# Patient Record
Sex: FEMALE | Race: WHITE | NOT HISPANIC OR LATINO | Employment: UNEMPLOYED | URBAN - NONMETROPOLITAN AREA
[De-identification: names, ages, dates, MRNs, and addresses within clinical notes are randomized per-mention and may not be internally consistent; named-entity substitution may affect disease eponyms.]

---

## 2020-11-13 ENCOUNTER — TELEPHONE (OUTPATIENT)
Dept: OBSTETRICS AND GYNECOLOGY | Facility: CLINIC | Age: 20
End: 2020-11-13

## 2020-11-13 NOTE — TELEPHONE ENCOUNTER
I called to remind patient of her appointment on Monday November 16. She did not answer, and her voicemail has not been set up.

## 2020-11-16 ENCOUNTER — INITIAL PRENATAL (OUTPATIENT)
Dept: OBSTETRICS AND GYNECOLOGY | Facility: CLINIC | Age: 20
End: 2020-11-16

## 2020-11-16 ENCOUNTER — LAB (OUTPATIENT)
Dept: LAB | Facility: HOSPITAL | Age: 20
End: 2020-11-16

## 2020-11-16 VITALS
DIASTOLIC BLOOD PRESSURE: 60 MMHG | BODY MASS INDEX: 21.91 KG/M2 | HEIGHT: 60 IN | WEIGHT: 111.6 LBS | SYSTOLIC BLOOD PRESSURE: 102 MMHG

## 2020-11-16 DIAGNOSIS — Z32.01 POSITIVE PREGNANCY TEST: Primary | ICD-10-CM

## 2020-11-16 DIAGNOSIS — Z36.87 ENCOUNTER FOR ANTENATAL SCREENING FOR UNCERTAIN DATES: ICD-10-CM

## 2020-11-16 DIAGNOSIS — R11.0 NAUSEA: ICD-10-CM

## 2020-11-16 DIAGNOSIS — Z32.00 PREGNANCY EXAMINATION OR TEST, PREGNANCY UNCONFIRMED: ICD-10-CM

## 2020-11-16 DIAGNOSIS — Z34.00 SUPERVISION OF NORMAL FIRST PREGNANCY, ANTEPARTUM: ICD-10-CM

## 2020-11-16 DIAGNOSIS — O99.331 MATERNAL TOBACCO USE IN FIRST TRIMESTER: ICD-10-CM

## 2020-11-16 DIAGNOSIS — Z34.00 SUPERVISION OF NORMAL FIRST PREGNANCY, ANTEPARTUM: Primary | ICD-10-CM

## 2020-11-16 LAB
ABO GROUP BLD: NORMAL
AMPHET+METHAMPHET UR QL: NEGATIVE
AMPHETAMINES UR QL: NEGATIVE
B-HCG UR QL: POSITIVE
BARBITURATES UR QL SCN: NEGATIVE
BASOPHILS # BLD AUTO: 0.05 10*3/MM3 (ref 0–0.2)
BASOPHILS NFR BLD AUTO: 0.5 % (ref 0–1.5)
BENZODIAZ UR QL SCN: NEGATIVE
BILIRUB UR QL STRIP: NEGATIVE
BLD GP AB SCN SERPL QL: NEGATIVE
BUPRENORPHINE SERPL-MCNC: NEGATIVE NG/ML
CANNABINOIDS SERPL QL: NEGATIVE
CLARITY UR: ABNORMAL
COCAINE UR QL: NEGATIVE
COLOR UR: ABNORMAL
DEPRECATED RDW RBC AUTO: 38.9 FL (ref 37–54)
EOSINOPHIL # BLD AUTO: 0.11 10*3/MM3 (ref 0–0.4)
EOSINOPHIL NFR BLD AUTO: 1 % (ref 0.3–6.2)
ERYTHROCYTE [DISTWIDTH] IN BLOOD BY AUTOMATED COUNT: 12.2 % (ref 12.3–15.4)
GLUCOSE UR STRIP-MCNC: NEGATIVE MG/DL
HCT VFR BLD AUTO: 35.4 % (ref 34–46.6)
HGB BLD-MCNC: 12.6 G/DL (ref 12–15.9)
HGB UR QL STRIP.AUTO: NEGATIVE
IMM GRANULOCYTES # BLD AUTO: 0.04 10*3/MM3 (ref 0–0.05)
IMM GRANULOCYTES NFR BLD AUTO: 0.4 % (ref 0–0.5)
INTERNAL NEGATIVE CONTROL: NEGATIVE
INTERNAL POSITIVE CONTROL: POSITIVE
KETONES UR QL STRIP: ABNORMAL
LEUKOCYTE ESTERASE UR QL STRIP.AUTO: ABNORMAL
LYMPHOCYTES # BLD AUTO: 1.43 10*3/MM3 (ref 0.7–3.1)
LYMPHOCYTES NFR BLD AUTO: 13.4 % (ref 19.6–45.3)
Lab: ABNORMAL
Lab: NORMAL
MCH RBC QN AUTO: 31.5 PG (ref 26.6–33)
MCHC RBC AUTO-ENTMCNC: 35.6 G/DL (ref 31.5–35.7)
MCV RBC AUTO: 88.5 FL (ref 79–97)
METHADONE UR QL SCN: NEGATIVE
MONOCYTES # BLD AUTO: 0.98 10*3/MM3 (ref 0.1–0.9)
MONOCYTES NFR BLD AUTO: 9.2 % (ref 5–12)
NEUTROPHILS NFR BLD AUTO: 75.5 % (ref 42.7–76)
NEUTROPHILS NFR BLD AUTO: 8.08 10*3/MM3 (ref 1.7–7)
NITRITE UR QL STRIP: NEGATIVE
NRBC BLD AUTO-RTO: 0 /100 WBC (ref 0–0.2)
OPIATES UR QL: NEGATIVE
OXYCODONE UR QL SCN: NEGATIVE
PCP UR QL SCN: NEGATIVE
PH UR STRIP.AUTO: 6 [PH] (ref 5–9)
PLATELET # BLD AUTO: 225 10*3/MM3 (ref 140–450)
PMV BLD AUTO: 11.5 FL (ref 6–12)
PROPOXYPH UR QL: NEGATIVE
PROT UR QL STRIP: ABNORMAL
RBC # BLD AUTO: 4 10*6/MM3 (ref 3.77–5.28)
RH BLD: POSITIVE
SP GR UR STRIP: 1.04 (ref 1–1.03)
TRICYCLICS UR QL SCN: NEGATIVE
UROBILINOGEN UR QL STRIP: ABNORMAL
WBC # BLD AUTO: 10.69 10*3/MM3 (ref 3.4–10.8)

## 2020-11-16 PROCEDURE — 87591 N.GONORRHOEAE DNA AMP PROB: CPT | Performed by: NURSE PRACTITIONER

## 2020-11-16 PROCEDURE — 99203 OFFICE O/P NEW LOW 30 MIN: CPT | Performed by: NURSE PRACTITIONER

## 2020-11-16 PROCEDURE — 80306 DRUG TEST PRSMV INSTRMNT: CPT | Performed by: NURSE PRACTITIONER

## 2020-11-16 PROCEDURE — 36415 COLL VENOUS BLD VENIPUNCTURE: CPT

## 2020-11-16 PROCEDURE — 87491 CHLMYD TRACH DNA AMP PROBE: CPT | Performed by: NURSE PRACTITIONER

## 2020-11-16 PROCEDURE — 81003 URINALYSIS AUTO W/O SCOPE: CPT | Performed by: NURSE PRACTITIONER

## 2020-11-16 PROCEDURE — 86787 VARICELLA-ZOSTER ANTIBODY: CPT

## 2020-11-16 PROCEDURE — 86850 RBC ANTIBODY SCREEN: CPT | Performed by: NURSE PRACTITIONER

## 2020-11-16 PROCEDURE — 81025 URINE PREGNANCY TEST: CPT | Performed by: NURSE PRACTITIONER

## 2020-11-16 PROCEDURE — 80081 OBSTETRIC PANEL INC HIV TSTG: CPT | Performed by: NURSE PRACTITIONER

## 2020-11-16 PROCEDURE — 87086 URINE CULTURE/COLONY COUNT: CPT | Performed by: NURSE PRACTITIONER

## 2020-11-16 PROCEDURE — 86900 BLOOD TYPING SEROLOGIC ABO: CPT | Performed by: NURSE PRACTITIONER

## 2020-11-16 PROCEDURE — 86901 BLOOD TYPING SEROLOGIC RH(D): CPT | Performed by: NURSE PRACTITIONER

## 2020-11-16 PROCEDURE — 86803 HEPATITIS C AB TEST: CPT | Performed by: NURSE PRACTITIONER

## 2020-11-16 PROCEDURE — 87661 TRICHOMONAS VAGINALIS AMPLIF: CPT | Performed by: NURSE PRACTITIONER

## 2020-11-16 RX ORDER — ONDANSETRON 4 MG/1
4 TABLET, ORALLY DISINTEGRATING ORAL
Status: ON HOLD | COMMUNITY
Start: 2020-06-09 | End: 2021-04-29

## 2020-11-16 RX ORDER — PRENATAL VIT NO.126/IRON/FOLIC 28MG-0.8MG
1 TABLET ORAL DAILY
COMMUNITY
End: 2022-01-11

## 2020-11-16 NOTE — PROGRESS NOTES
I spent approximately 45 minutes with the patient acquiring the health and history intake and discussing topics related to healthy lifestyle. She is accompanied by her boyfriend Joshua.  Her LMP is approximately 9/26/20.  This is her 1st pregnancy. A newob bag is given. The 1st trimester teaching was done with the patient. We discussed a healthy diet and exercise and what is recommended. I also discussed Listeriosis and Toxoplasmosis. She says she does not eat fish, so mercury was not discussed. I informed patient not to be in hot tubs, saunas, or tanning beds. We discussed that spotting may occur after intercourse which is common, but if heavy bleeding like a period occurs to [facility] or hospital if clinic is closed.  I encouraged her to make an appointment with the dentist if she has not had a dental exam and cleaning in the last 6 months.  I instructed the patient that alcohol, illicit drug use, and tobacco smoking are not recommended in pregnancy. I told her to avoid secondhand smoke. She says she does smoke about 2 packs of cigarettes per week. She is trying to quit. She plans to breastfeed. I gave her pamphlet on breastfeeding classes and the breastfeeding mothers support group. These services are provided by Veda Perkins, Lactation Consultant. She filled out the health department referral form and depression screening questionnaire. I gave her a WIC and HANDS pamphlet. She says she did sign up for WIC. I told the patient that group prenatal education classes are offered here. I instructed patient to let the provider know if she would like to join a group after EDC is established. A Centering Pregnancy pamphlet is given. I encouraged the patient to get the TDAP vaccine in the 3rd trimester.  I discussed with the patient that a pediatrician needs to be chosen prior to delivery for the infant to have an appointment scheduled before leaving the hospital.  I discussed lab tests will be done today. All  questions were answered at this time. She is seeing Guerline HOWELL today.

## 2020-11-17 PROBLEM — R11.0 NAUSEA: Status: ACTIVE | Noted: 2020-11-17

## 2020-11-17 PROBLEM — O99.331 MATERNAL TOBACCO USE IN FIRST TRIMESTER: Status: ACTIVE | Noted: 2020-11-17

## 2020-11-17 LAB
BACTERIA SPEC AEROBE CULT: NO GROWTH
C TRACH RRNA SPEC QL NAA+PROBE: NEGATIVE
HBV SURFACE AG SERPL QL IA: NORMAL
HCV AB SER DONR QL: NORMAL
HIV1+2 AB SER QL: NORMAL
N GONORRHOEA RRNA SPEC QL NAA+PROBE: NEGATIVE
RPR SER QL: NORMAL
RUBV IGG SERPL IA-ACNC: NORMAL
T VAGINALIS DNA SPEC QL NAA+PROBE: NEGATIVE
VZV IGG SER IA-ACNC: NEGATIVE

## 2020-11-17 NOTE — PROGRESS NOTES
[facility]  Obstetrics Visit    CHIEF COMPLAINT:  New prenatal visit    HISTORY OF PRESENT ILLNESS:  Becky Shaffer is a 19 y.o. y/o  at Unknown by LMP (Patient's last menstrual period was 2020 (approximate).  This was not a planned pregnancy and the patient is supported by significant other.  Reports occasional nausea.  She denies any vaginal bleeding.  She has started taking a prenatal vitamin.    REVIEW OF SYSTEMS  Review of Systems   Constitutional: Negative for activity change, appetite change, diaphoresis, fatigue, unexpected weight gain and unexpected weight loss.   Respiratory: Negative for apnea, chest tightness and shortness of breath.    Cardiovascular: Negative for chest pain and palpitations.   Gastrointestinal: Positive for nausea. Negative for abdominal distention, abdominal pain, constipation, diarrhea, rectal pain, vomiting, GERD and indigestion.   Genitourinary: Negative for amenorrhea, breast discharge, breast lump, breast pain, decreased libido, decreased urine volume, difficulty urinating, dyspareunia, dysuria, flank pain, frequency, genital sores, hematuria, pelvic pain, pelvic pressure, urgency, urinary incontinence, vaginal bleeding, vaginal discharge and vaginal pain.   Musculoskeletal: Negative for myalgias.   Skin: Negative for color change, dry skin and skin lesions.   Neurological: Negative for light-headedness and headache.   Psychiatric/Behavioral: Negative for agitation, dysphoric mood, sleep disturbance, suicidal ideas, depressed mood and stress. The patient is not nervous/anxious.        PRENATAL RISK FACTORS   Problems (from 20 to present)     Problem Noted Resolved    Maternal tobacco use in first trimester 2020 by Guerline Stacy, LYNDA No    Nausea 2020 by Guerline Stacy, LYNDA No          DATING CRITERIA:  Approx. LMP (20) -- LILIANA 2021      OBSTETRIC HISTORY:  OB History    Para  Term  AB Living   1             SAB TAB Ectopic Molar Multiple Live Births                    # Outcome Date GA Lbr Gera/2nd Weight Sex Delivery Anes PTL Lv   1 Current              GYN HISTORY:    Last pap smear:   Last Completed Pap Smear     Patient has no health maintenance due at this time            PAST MEDICAL HISTORY:  Past Medical History:   Diagnosis Date   • Anxiety    • Migraine    • Smoker      PAST SURGICAL HISTORY:  Past Surgical History:   Procedure Laterality Date   • WISDOM TOOTH EXTRACTION       FAMILY HISTORY:  Family History   Problem Relation Age of Onset   • No Known Problems Sister    • No Known Problems Brother    • No Known Problems Sister    • No Known Problems Brother    • No Known Problems Brother      SOCIAL HISTORY:  Social History     Socioeconomic History   • Marital status: Single     Spouse name: Not on file   • Number of children: Not on file   • Years of education: Not on file   • Highest education level: Not on file   Tobacco Use   • Smoking status: Current Every Day Smoker   • Smokeless tobacco: Never Used   • Tobacco comment: 2packs per week   Substance and Sexual Activity   • Alcohol use: Not Currently   • Drug use: Not Currently   • Sexual activity: Yes     Partners: Male     GENETIC SCREENING:  Age >36 yo as of LILIANA: no  Thalassemia: no  NTD: no  CHD: no  Down Syndrome/MR/Fragile X/Autism: no  Ashkenazi Nondenominational with Maximino-Sachs, [place], familial dysautonomia: non  Sickle cell disease or trait: no  Hemophilia: no  Muscular dystrophy: no  Cystic fibrosis: no  Glades's chorea: no  Birth defects: no  Genetic/chromosomal disorders: no    INFECTION HISTORY:  TB exposure: no  HSV: no  Illness since LMP: no  Prior GBS infected child: no  STIs: no    ALLERGIES:  No Known Allergies    MEDICATIONS:  Prior to Admission medications    Medication Sig Start Date End Date Taking? Authorizing Provider   ondansetron ODT (ZOFRAN-ODT) 4 MG disintegrating tablet Take 4 mg by mouth.  20  Yes Provider, MD Amanuel   prenatal vitamin (prenatal, CLASSIC, vitamin) tablet Take 1 tablet by mouth Daily.   Yes Provider, MD Amanuel       PHYSICAL EXAM:   LMP 2020 (Approximate)   General: Alert, healthy, no distress, well nourished and well developed.  Neurologic: Alert, oriented to person, place, and time.  Gait normal.  Cranial nerves II-XII grossly intact.  HEENT: Normocephalic, atraumatic.  Extraocular muscles intact, pupils equal and reactive x2.    Teeth: Normal hygiene.  Neck: Supple, no adenopathy, thyroid normal size, non-tender, without nodularity, trachea midline.  Breasts: No masses, skin dimpling, skin retraction, nipple discharge, or asymmetry bilaterally.  Lungs: Normal respiratory effort.  Clear to auscultation bilaterally.  No wheezes, rhonci, or rales.  Heart: Regular rate and rhythm.  No murmer, rub or gallop.  Abdomen: Soft, non-tender, non-distended,no masses, no hepatosplenomegaly, no hernia.  Skin: No rash, no lesions.  Extremities: No cyanosis, clubbing or edema.    Bedside ultrasound performed by myself which shows the findings below: +HHUS      IMPRESSION:  Becky Shaffer is a 19 y.o.  at Unknown for a new prenatal visit.    PLAN:  1. NOB  - Prenatal labs ordered  - Genetic testing, including cystic fibrosis, was discussed and patient declines   - Continue prenatal vitamins  - Weight gain counseling performed.   - [place]id BMI 18.5-24.9: Recommend 25-35 lb  - Return to clinic in 4 weeks for return prenatal visit and dating scan   - Reviewed COVID-19 visitation policy  - Reviewed COVID-19 precautions     Diagnosis Plan   1. Positive pregnancy test     2. Maternal tobacco use in first trimester  Educated on risks of smoking in pregnancy, strongly encouraged complete smoking cessation    3. Nausea  Encouraged small frequent protein snacks   4. Encounter for  screening for uncertain dates  US Ob Transvaginal     Guerline Stacy  APRN  11/17/2020  11:16 CST

## 2020-12-16 ENCOUNTER — ROUTINE PRENATAL (OUTPATIENT)
Dept: OBSTETRICS AND GYNECOLOGY | Facility: CLINIC | Age: 20
End: 2020-12-16

## 2020-12-16 ENCOUNTER — TELEPHONE (OUTPATIENT)
Dept: OBSTETRICS AND GYNECOLOGY | Facility: CLINIC | Age: 20
End: 2020-12-16

## 2020-12-16 VITALS — DIASTOLIC BLOOD PRESSURE: 68 MMHG | SYSTOLIC BLOOD PRESSURE: 92 MMHG | WEIGHT: 109 LBS | BODY MASS INDEX: 21.29 KG/M2

## 2020-12-16 DIAGNOSIS — Z34.01 ENCOUNTER FOR SUPERVISION OF NORMAL FIRST PREGNANCY IN FIRST TRIMESTER: ICD-10-CM

## 2020-12-16 DIAGNOSIS — O99.331 MATERNAL TOBACCO USE IN FIRST TRIMESTER: ICD-10-CM

## 2020-12-16 DIAGNOSIS — Z3A.12 12 WEEKS GESTATION OF PREGNANCY: Primary | ICD-10-CM

## 2020-12-16 DIAGNOSIS — O21.9 NAUSEA AND VOMITING DURING PREGNANCY PRIOR TO 22 WEEKS GESTATION: ICD-10-CM

## 2020-12-16 PROBLEM — Z34.00 SUPERVISION OF NORMAL FIRST PREGNANCY: Status: ACTIVE | Noted: 2020-12-16

## 2020-12-16 PROCEDURE — 99213 OFFICE O/P EST LOW 20 MIN: CPT | Performed by: FAMILY MEDICINE

## 2020-12-16 RX ORDER — PYRIDOXINE HCL (VITAMIN B6) 25 MG
25 TABLET ORAL EVERY 8 HOURS
Qty: 90 TABLET | Refills: 1 | Status: ON HOLD | OUTPATIENT
Start: 2020-12-16 | End: 2021-04-29

## 2020-12-16 NOTE — TELEPHONE ENCOUNTER
Correct spelling of pt's name is RACHEL Bah medicaid has patients named spelled without the E on the end. CIRO. Called pt to inform her to please have this corrected. She said she would take care of it.

## 2020-12-16 NOTE — PROGRESS NOTES
CC: Prenatal visit    Becky Shaffer is a 20 y.o.  at 12w5d.  Doing well.  No complaints.  Denies contractions, LOF, or VB.  Has not yet felt FM.    Dating scan: IUP, FHR 168bpm, EGA by CRL 12w5d with LILIANA 21 +1w1d from LMP. Will use this u/s to est LILIANA.    BP 92/68   Wt 49.4 kg (109 lb)   LMP 2020 (Approximate)   BMI 21.29 kg/m²   SVE: deferred     Fetal Heart Rate: 168 US     Problems (from 20 to present)     Problem Noted Resolved    Maternal tobacco use in first trimester 2020 by Guerline Stacy APRN No    Nausea 2020 by Guerilne Stacy APRN No          A/P: Becky Shaffer is a 20 y.o.  at 12w5d.  - RTC in 4 weeks  - She is unsure about OB Centering  - Cramping/bleeding precautions given     Diagnosis Plan   1. 12 weeks gestation of pregnancy     2. Nausea and vomiting during pregnancy prior to 22 weeks gestation  pyridoxine (VITAMIN B-6) 25 MG tablet    doxylamine (UNISOM) 25 MG tablet   3. Maternal tobacco use in first trimester     4. Encounter for supervision of normal first pregnancy in first trimester         Signature  Crystal Wheatley MD  [facility]  [address] [place] [address]  Office: [phone]      This document has been electronically signed by Crystal Wheatley MD on 2020 13:25 CST

## 2021-01-11 ENCOUNTER — ROUTINE PRENATAL (OUTPATIENT)
Dept: OBSTETRICS AND GYNECOLOGY | Facility: CLINIC | Age: 21
End: 2021-01-11

## 2021-01-11 VITALS — SYSTOLIC BLOOD PRESSURE: 98 MMHG | BODY MASS INDEX: 21.29 KG/M2 | DIASTOLIC BLOOD PRESSURE: 62 MMHG | WEIGHT: 109 LBS

## 2021-01-11 DIAGNOSIS — O99.331 MATERNAL TOBACCO USE IN FIRST TRIMESTER: ICD-10-CM

## 2021-01-11 DIAGNOSIS — Z34.01 ENCOUNTER FOR SUPERVISION OF NORMAL FIRST PREGNANCY IN FIRST TRIMESTER: ICD-10-CM

## 2021-01-11 DIAGNOSIS — O21.9 NAUSEA AND VOMITING DURING PREGNANCY PRIOR TO 22 WEEKS GESTATION: ICD-10-CM

## 2021-01-11 DIAGNOSIS — Z3A.16 16 WEEKS GESTATION OF PREGNANCY: Primary | ICD-10-CM

## 2021-01-11 DIAGNOSIS — Z36.3 ENCOUNTER FOR ANTENATAL SCREENING FOR MALFORMATIONS: ICD-10-CM

## 2021-01-11 PROCEDURE — 99213 OFFICE O/P EST LOW 20 MIN: CPT | Performed by: FAMILY MEDICINE

## 2021-01-11 NOTE — PROGRESS NOTES
CC: Prenatal visit    Becky Shaffer is a 20 y.o.  at 16w3d.  Doing well.  No complaints.  Denies contractions, LOF, or VB.  Has started to feel FM. Interested in OB Centering.    BP 98/62   Wt 49.4 kg (109 lb)   LMP 2020 (Approximate)   BMI 21.29 kg/m²   SVE: deferred     Fetal Heart Rate: 160s HHUS     Problems (from 20 to present)     Problem Noted Resolved    Supervision of normal first pregnancy 2020 by Crystal Wheatley MD No    Overview Signed 2020  1:29 PM by Crystal Wheatley MD     A POS/ Rubella equivocal/ GBS @ 36wks  DatinTUS 20 12w5d  Genetics: declined  Tdap: 28wks  Flu: will offer  Anatomy: 20wks  1h Glucola: 28wks  Lab Results   Component Value Date    HGB 12.6 2020    HCT 35.4 2020     2020     Feeding Method: undecided  BC plan: ?           Maternal tobacco use in first trimester 2020 by Guerline Stacy APRN No    Nausea and vomiting during pregnancy prior to 22 weeks gestation 2020 by Guerline Stacy APRN No          A/P: Becky Shaffer is a 20 y.o.  at 16w3d.  - RTC in 4 weeks for OB Centering & Anatomy scan  - cramping precautions given     Diagnosis Plan   1. 16 weeks gestation of pregnancy     2. Encounter for supervision of normal first pregnancy in first trimester     3. Nausea and vomiting during pregnancy prior to 22 weeks gestation     4. Maternal tobacco use in first trimester     5. Encounter for  screening for malformations  US Ob 14 + Weeks Single or First Gestation       Signature  Crystal Wheatley MD  [facility]  [address] [place] [address], [place] [address]  Office: [phone]      This document has been electronically signed by Crystal Wheatley MD on 2021 13:24 CST

## 2021-02-10 ENCOUNTER — ROUTINE PRENATAL (OUTPATIENT)
Dept: OBSTETRICS AND GYNECOLOGY | Facility: CLINIC | Age: 21
End: 2021-02-10

## 2021-02-10 VITALS — DIASTOLIC BLOOD PRESSURE: 60 MMHG | SYSTOLIC BLOOD PRESSURE: 98 MMHG | WEIGHT: 110 LBS | BODY MASS INDEX: 21.48 KG/M2

## 2021-02-10 DIAGNOSIS — O99.331 MATERNAL TOBACCO USE IN FIRST TRIMESTER: ICD-10-CM

## 2021-02-10 DIAGNOSIS — Z34.02 ENCOUNTER FOR SUPERVISION OF NORMAL FIRST PREGNANCY IN SECOND TRIMESTER: ICD-10-CM

## 2021-02-10 DIAGNOSIS — Z3A.20 20 WEEKS GESTATION OF PREGNANCY: Primary | ICD-10-CM

## 2021-02-10 DIAGNOSIS — M54.50 LOW BACK PAIN DURING PREGNANCY, SECOND TRIMESTER: ICD-10-CM

## 2021-02-10 DIAGNOSIS — Z36.2 ANTENATAL SCREENING BASED ON AMNIOCENTESIS: ICD-10-CM

## 2021-02-10 DIAGNOSIS — O21.9 NAUSEA AND VOMITING DURING PREGNANCY PRIOR TO 22 WEEKS GESTATION: ICD-10-CM

## 2021-02-10 DIAGNOSIS — Z36.2 ENCOUNTER FOR OTHER ANTENATAL SCREENING FOLLOW-UP: ICD-10-CM

## 2021-02-10 DIAGNOSIS — O26.892 LOW BACK PAIN DURING PREGNANCY, SECOND TRIMESTER: ICD-10-CM

## 2021-02-10 PROCEDURE — 99213 OFFICE O/P EST LOW 20 MIN: CPT | Performed by: FAMILY MEDICINE

## 2021-02-10 NOTE — PROGRESS NOTES
CC: Prenatal visit    Becky Shaffer is a 20 y.o.  at 20w5d.  Denies contractions, LOF, or VB.  Reports good FM. She reports radicular back pain.   She was scheduled for OB Centering today but canceled.  She has decided she does not wish to do OB Centering.    Anatomy: breech, anterior placenta, FHR 137bpm, male, EFW 348g, sub opt feet/spine/3VTV. Findings reviewed with patient/FOB    BP 98/60   Wt 49.9 kg (110 lb)   LMP 2020 (Approximate)   BMI 21.48 kg/m²   SVE: deferred     Fetal Heart Rate: 137US     Problems (from 20 to present)     Problem Noted Resolved    Encounter for supervision of normal first pregnancy in second trimester 2020 by Crystal Wheatley MD No    Overview Signed 2020  1:29 PM by Crystal Wheatley MD     A POS/ Rubella equivocal/ GBS @ 36wks  DatinTUS 20 12w5d  Genetics: declined  Tdap: 28wks  Flu: will offer  Anatomy: 20wks  1h Glucola: 28wks  Lab Results   Component Value Date    HGB 12.6 2020    HCT 35.4 2020     2020     Feeding Method: undecided  BC plan: ?           Maternal tobacco use in first trimester 2020 by Guerline Stacy, LYNDA No    Nausea and vomiting during pregnancy prior to 22 weeks gestation 2020 by Guerline Stacy APRN No          A/P: Becky Shaffer is a 20 y.o.  at 20w5d.  - RTC in 4 weeks with repeat scan for sub opt views  - cramping precautions given  - pt has pregnancy support belt at home - recommend she try this to help with back pain; if pain persists will refer to PFPT     Diagnosis Plan   1. 20 weeks gestation of pregnancy     2. Encounter for supervision of normal first pregnancy in second trimester     3. Nausea and vomiting during pregnancy prior to 22 weeks gestation     4. Maternal tobacco use in first trimester     5.  screening based on amniocentesis     6. Encounter for other  screening follow-up  MFM  OB US MAD   7. Low back pain during pregnancy, second trimester         Signature  Crystal Wheatley MD  [address], [place] [address]  Office: [phone]      This document has been electronically signed by Crystal Wheatley MD on February 10, 2021 12:04 CST

## 2021-03-10 ENCOUNTER — ROUTINE PRENATAL (OUTPATIENT)
Dept: OBSTETRICS AND GYNECOLOGY | Facility: CLINIC | Age: 21
End: 2021-03-10

## 2021-03-10 VITALS — BODY MASS INDEX: 22.19 KG/M2 | SYSTOLIC BLOOD PRESSURE: 112 MMHG | DIASTOLIC BLOOD PRESSURE: 64 MMHG | WEIGHT: 113.6 LBS

## 2021-03-10 DIAGNOSIS — Z3A.24 24 WEEKS GESTATION OF PREGNANCY: Primary | ICD-10-CM

## 2021-03-10 DIAGNOSIS — Z82.79 FAMILY HISTORY OF CONGENITAL HEART DEFECT: ICD-10-CM

## 2021-03-10 DIAGNOSIS — O21.9 NAUSEA AND VOMITING DURING PREGNANCY PRIOR TO 22 WEEKS GESTATION: ICD-10-CM

## 2021-03-10 DIAGNOSIS — O99.331 MATERNAL TOBACCO USE IN FIRST TRIMESTER: ICD-10-CM

## 2021-03-10 DIAGNOSIS — Z34.02 ENCOUNTER FOR SUPERVISION OF NORMAL FIRST PREGNANCY IN SECOND TRIMESTER: ICD-10-CM

## 2021-03-10 PROCEDURE — 99213 OFFICE O/P EST LOW 20 MIN: CPT | Performed by: FAMILY MEDICINE

## 2021-03-10 NOTE — PROGRESS NOTES
"CC: Prenatal visit    Becky Shaffer is a 20 y.o.  at 24w5d.  Doing well.  No complaints.  Denies contractions, LOF, or VB.  Reports good FM.  Mother-in-law is with patient today & reports FOB had a \"heart defect at birth - an electrical issue that he had surgery for\" & she wanted to make sure we were aware of it.    US: cephalic, anterior placenta, FHR 142bpm, EFW 731g, all anatomy seen & wnl on prelim report.    /64   Wt 51.5 kg (113 lb 9.6 oz)   LMP 2020 (Approximate)   BMI 22.19 kg/m²   SVE: deferred  Fundal Height (cm): 24 cm  Fetal Heart Rate: 142 US     Problems (from 20 to present)     Problem Noted Resolved    Encounter for supervision of normal first pregnancy in second trimester 2020 by Crystal Wheatley MD No    Overview Addendum 2/10/2021 12:08 PM by Crystal Wheatley MD     A POS/ Rubella equivocal/ GBS @ 36wks  DatinTUS 20 12w5d  Genetics: declined  Tdap: 28wks  Flu: will offer  Anatomy: male, anterior placenta, sub opt feet/spine  1h Glucola: 28wks  Lab Results   Component Value Date    HGB 12.6 2020    HCT 35.4 2020     2020     Feeding Method: undecided  BC plan: ?           Previous Version    Maternal tobacco use in first trimester 2020 by Guerline Stacy APRN No    Nausea and vomiting during pregnancy prior to 22 weeks gestation 2020 by Guerline Stacy APRN No          A/P: Becky Shaffer is a 20 y.o.  at 24w5d.  - RTC in 2 weeks with glucola & NIPS for possible CHD in FOB  - FKC reviewed.  PTL precautions given.  Encouraged to drink 3-4 glasses of ice water if she experiences contractions.  If contractions occur regularly (every 5-10 minutes or less) for 1 hour and do not resolve with drinking fluids and/or taking a warm bath, patient advised to come to L&D for evaluation.       Diagnosis Plan   1. 24 weeks gestation of pregnancy     2. Encounter for " supervision of normal first pregnancy in second trimester     3. Nausea and vomiting during pregnancy prior to 22 weeks gestation     4. Maternal tobacco use in first trimester     5. Family history of congenital heart defect  INVITAE NIPS       Signature  Crystal Wheatley MD  [facility] [place] [address]  Office: [phone]      This document has been electronically signed by Crystal Wheatley MD on March 10, 2021 14:06 CST

## 2021-03-22 ENCOUNTER — LAB (OUTPATIENT)
Dept: LAB | Facility: HOSPITAL | Age: 21
End: 2021-03-22

## 2021-03-22 PROBLEM — Z34.03 ENCOUNTER FOR SUPERVISION OF NORMAL FIRST PREGNANCY IN THIRD TRIMESTER: Status: ACTIVE | Noted: 2020-12-16

## 2021-03-22 PROCEDURE — 82950 GLUCOSE TEST: CPT | Performed by: FAMILY MEDICINE

## 2021-03-22 PROCEDURE — 85025 COMPLETE CBC W/AUTO DIFF WBC: CPT | Performed by: FAMILY MEDICINE

## 2021-03-31 DIAGNOSIS — Z82.79 FAMILY HISTORY OF CONGENITAL HEART DEFECT: ICD-10-CM

## 2021-04-12 ENCOUNTER — ROUTINE PRENATAL (OUTPATIENT)
Dept: OBSTETRICS AND GYNECOLOGY | Facility: CLINIC | Age: 21
End: 2021-04-12

## 2021-04-12 VITALS — BODY MASS INDEX: 23.4 KG/M2 | SYSTOLIC BLOOD PRESSURE: 108 MMHG | WEIGHT: 119.8 LBS | DIASTOLIC BLOOD PRESSURE: 60 MMHG

## 2021-04-12 DIAGNOSIS — Z34.03 ENCOUNTER FOR SUPERVISION OF NORMAL FIRST PREGNANCY IN THIRD TRIMESTER: ICD-10-CM

## 2021-04-12 DIAGNOSIS — Z3A.29 29 WEEKS GESTATION OF PREGNANCY: Primary | ICD-10-CM

## 2021-04-12 DIAGNOSIS — O99.331 MATERNAL TOBACCO USE IN FIRST TRIMESTER: ICD-10-CM

## 2021-04-12 DIAGNOSIS — O21.9 NAUSEA AND VOMITING DURING PREGNANCY PRIOR TO 22 WEEKS GESTATION: ICD-10-CM

## 2021-04-12 PROCEDURE — 99213 OFFICE O/P EST LOW 20 MIN: CPT | Performed by: FAMILY MEDICINE

## 2021-04-12 NOTE — PROGRESS NOTES
CC: Prenatal visit    Becky Shaffer is a 20 y.o.  at 29w3d.  Doing well.  No complaints.  Denies contractions, LOF, or VB.  Reports good FM. NIPS low risk.    /60   Wt 54.3 kg (119 lb 12.8 oz)   LMP 2020 (Approximate)   BMI 23.40 kg/m²   SVE: deferred  Fundal Height (cm): 29 cm  Fetal Heart Rate: 132     Problems (from 20 to present)     Problem Noted Resolved    Encounter for supervision of normal first pregnancy in third trimester 2020 by Crystal Wheatley MD No    Overview Addendum 3/22/2021 12:16 PM by Crystal Wheatley MD     A POS/ Rubella equivocal/ GBS @ 36wks  DatinTUS 20 12w5d  Genetics: pending  Tdap: 3/22/21  Anatomy: male, anterior placenta, wnl  1h Glucola: 95  Lab Results   Component Value Date    HGB 12.6 2020    HCT 35.4 2020     2020     Feeding Method: undecided  BC plan: ?           Previous Version    Maternal tobacco use in first trimester 2020 by Guerline Stacy APRN No    Nausea and vomiting during pregnancy prior to 22 weeks gestation 2020 by Guerline Stacy APRN No          A/P: Becky Shaffer is a 20 y.o.  at 29w3d.  - RTC in 3 weeks  - FKC reviewed.  PTL precautions given.  Encouraged to drink 3-4 glasses of ice water if she experiences contractions.  If contractions occur regularly (every 5-10 minutes or less) for 1 hour and do not resolve with drinking fluids and/or taking a warm bath, patient advised to come to L&D for evaluation.       Diagnosis Plan   1. 29 weeks gestation of pregnancy     2. Encounter for supervision of normal first pregnancy in third trimester     3. Nausea and vomiting during pregnancy prior to 22 weeks gestation     4. Maternal tobacco use in first trimester         Signature  Crystal Wheatley MD  [facility] [place] [address]  Office: [phone]      This  document has been electronically signed by Crystal Wheatley MD on April 12, 2021 10:11 CDT

## 2021-04-17 ENCOUNTER — HOSPITAL ENCOUNTER (OUTPATIENT)
Facility: HOSPITAL | Age: 21
Discharge: HOME | End: 2021-04-17
Attending: OBSTETRICS & GYNECOLOGY | Admitting: OBSTETRICS & GYNECOLOGY

## 2021-04-17 VITALS
HEART RATE: 93 BPM | WEIGHT: 120 LBS | SYSTOLIC BLOOD PRESSURE: 105 MMHG | OXYGEN SATURATION: 99 % | HEIGHT: 60 IN | BODY MASS INDEX: 23.56 KG/M2 | DIASTOLIC BLOOD PRESSURE: 66 MMHG

## 2021-04-17 LAB
ALBUMIN SERPL-MCNC: 3.5 G/DL (ref 3.5–5.2)
ALBUMIN/GLOB SERPL: 1.3 G/DL
ALP SERPL-CCNC: 88 U/L (ref 39–117)
ALT SERPL W P-5'-P-CCNC: 13 U/L (ref 1–33)
AMPHET+METHAMPHET UR QL: NEGATIVE
AMPHETAMINES UR QL: NEGATIVE
ANION GAP SERPL CALCULATED.3IONS-SCNC: 4 MMOL/L (ref 5–15)
AST SERPL-CCNC: 16 U/L (ref 1–32)
BACTERIA UR QL AUTO: ABNORMAL /HPF
BARBITURATES UR QL SCN: NEGATIVE
BASOPHILS # BLD AUTO: 0.04 10*3/MM3 (ref 0–0.2)
BASOPHILS NFR BLD AUTO: 0.3 % (ref 0–1.5)
BENZODIAZ UR QL SCN: NEGATIVE
BILIRUB SERPL-MCNC: 0.2 MG/DL (ref 0–1.2)
BILIRUB UR QL STRIP: NEGATIVE
BUN SERPL-MCNC: 7 MG/DL (ref 6–20)
BUN/CREAT SERPL: 15.2 (ref 7–25)
BUPRENORPHINE SERPL-MCNC: NEGATIVE NG/ML
CALCIUM SPEC-SCNC: 9.1 MG/DL (ref 8.6–10.5)
CANNABINOIDS SERPL QL: NEGATIVE
CHLORIDE SERPL-SCNC: 107 MMOL/L (ref 98–107)
CLARITY UR: ABNORMAL
CO2 SERPL-SCNC: 24 MMOL/L (ref 22–29)
COCAINE UR QL: NEGATIVE
COLOR UR: YELLOW
CREAT SERPL-MCNC: 0.46 MG/DL (ref 0.57–1)
DEPRECATED RDW RBC AUTO: 41.9 FL (ref 37–54)
EOSINOPHIL # BLD AUTO: 0.15 10*3/MM3 (ref 0–0.4)
EOSINOPHIL NFR BLD AUTO: 1.2 % (ref 0.3–6.2)
ERYTHROCYTE [DISTWIDTH] IN BLOOD BY AUTOMATED COUNT: 12.9 % (ref 12.3–15.4)
GFR SERPL CREATININE-BSD FRML MDRD: >150 ML/MIN/1.73
GLOBULIN UR ELPH-MCNC: 2.6 GM/DL
GLUCOSE BLDC GLUCOMTR-MCNC: 76 MG/DL (ref 70–130)
GLUCOSE SERPL-MCNC: 80 MG/DL (ref 65–99)
GLUCOSE UR STRIP-MCNC: NEGATIVE MG/DL
HCT VFR BLD AUTO: 34.2 % (ref 34–46.6)
HGB BLD-MCNC: 11.8 G/DL (ref 12–15.9)
HGB UR QL STRIP.AUTO: NEGATIVE
HYALINE CASTS UR QL AUTO: ABNORMAL /LPF
IMM GRANULOCYTES # BLD AUTO: 0.1 10*3/MM3 (ref 0–0.05)
IMM GRANULOCYTES NFR BLD AUTO: 0.8 % (ref 0–0.5)
KETONES UR QL STRIP: NEGATIVE
LEUKOCYTE ESTERASE UR QL STRIP.AUTO: ABNORMAL
LYMPHOCYTES # BLD AUTO: 2.42 10*3/MM3 (ref 0.7–3.1)
LYMPHOCYTES NFR BLD AUTO: 20.1 % (ref 19.6–45.3)
MCH RBC QN AUTO: 30.9 PG (ref 26.6–33)
MCHC RBC AUTO-ENTMCNC: 34.5 G/DL (ref 31.5–35.7)
MCV RBC AUTO: 89.5 FL (ref 79–97)
METHADONE UR QL SCN: NEGATIVE
MONOCYTES # BLD AUTO: 1.09 10*3/MM3 (ref 0.1–0.9)
MONOCYTES NFR BLD AUTO: 9 % (ref 5–12)
NEUTROPHILS NFR BLD AUTO: 68.6 % (ref 42.7–76)
NEUTROPHILS NFR BLD AUTO: 8.25 10*3/MM3 (ref 1.7–7)
NITRITE UR QL STRIP: NEGATIVE
NRBC BLD AUTO-RTO: 0 /100 WBC (ref 0–0.2)
OPIATES UR QL: NEGATIVE
OXYCODONE UR QL SCN: NEGATIVE
PCP UR QL SCN: NEGATIVE
PH UR STRIP.AUTO: 7 [PH] (ref 5–9)
PLATELET # BLD AUTO: 139 10*3/MM3 (ref 140–450)
PMV BLD AUTO: 11.5 FL (ref 6–12)
POTASSIUM SERPL-SCNC: 3.9 MMOL/L (ref 3.5–5.2)
PROPOXYPH UR QL: NEGATIVE
PROT SERPL-MCNC: 6.1 G/DL (ref 6–8.5)
PROT UR QL STRIP: NEGATIVE
RBC # BLD AUTO: 3.82 10*6/MM3 (ref 3.77–5.28)
RBC # UR: ABNORMAL /HPF
REF LAB TEST METHOD: ABNORMAL
SODIUM SERPL-SCNC: 135 MMOL/L (ref 136–145)
SP GR UR STRIP: 1.02 (ref 1–1.03)
SQUAMOUS #/AREA URNS HPF: ABNORMAL /HPF
TRICYCLICS UR QL SCN: NEGATIVE
UROBILINOGEN UR QL STRIP: ABNORMAL
WBC # BLD AUTO: 12.05 10*3/MM3 (ref 3.4–10.8)
WBC UR QL AUTO: ABNORMAL /HPF

## 2021-04-17 PROCEDURE — 82962 GLUCOSE BLOOD TEST: CPT

## 2021-04-17 PROCEDURE — 80306 DRUG TEST PRSMV INSTRMNT: CPT | Performed by: OBSTETRICS & GYNECOLOGY

## 2021-04-17 PROCEDURE — 99213 OFFICE O/P EST LOW 20 MIN: CPT | Performed by: OBSTETRICS & GYNECOLOGY

## 2021-04-17 PROCEDURE — 81001 URINALYSIS AUTO W/SCOPE: CPT | Performed by: OBSTETRICS & GYNECOLOGY

## 2021-04-17 PROCEDURE — 36415 COLL VENOUS BLD VENIPUNCTURE: CPT | Performed by: OBSTETRICS & GYNECOLOGY

## 2021-04-17 PROCEDURE — 85025 COMPLETE CBC W/AUTO DIFF WBC: CPT | Performed by: OBSTETRICS & GYNECOLOGY

## 2021-04-17 PROCEDURE — G0463 HOSPITAL OUTPT CLINIC VISIT: HCPCS

## 2021-04-17 PROCEDURE — 59025 FETAL NON-STRESS TEST: CPT | Performed by: OBSTETRICS & GYNECOLOGY

## 2021-04-17 PROCEDURE — 80053 COMPREHEN METABOLIC PANEL: CPT | Performed by: OBSTETRICS & GYNECOLOGY

## 2021-04-17 PROCEDURE — 59025 FETAL NON-STRESS TEST: CPT

## 2021-04-17 RX ORDER — FERROUS SULFATE 325(65) MG
325 TABLET ORAL
Qty: 30 TABLET | Refills: 6 | Status: SHIPPED | OUTPATIENT
Start: 2021-04-17 | End: 2022-01-11

## 2021-04-17 NOTE — NON STRESS TEST
Becky Shaffer, a  at 30w1d with an LILIANA of 2021, by Ultrasound, was seen at [facility] for a nonstress test.    Chief Complaint   Patient presents with    Dizziness     states mostly when standing, reports that she ate 4 granola bars to see of it would help, reports good fetal movement       Patient Active Problem List   Diagnosis    Maternal tobacco use in first trimester    Nausea and vomiting during pregnancy prior to 22 weeks gestation    Encounter for supervision of normal first pregnancy in third trimester       Start Time: 1325  Stop Time: 1355      Interpretation A  Nonstress Test Interpretation A: Reactive (21 1355 : Jluis Bowman, RN)  Comments A: strip reviewed with KHARI Brunson RN (21 1355 : Jluis Bowman, RN)        CBC, CMP, U/A, UDS done, Orthostatic B/P's done-WNL, FSBS -76  Patient presents with complaint of dizziness    Diagnosis near syncope    Fetal heart rate strip over read and agree reactive

## 2021-04-17 NOTE — DISCHARGE INSTRUCTIONS
Return to labor and delivery for contractions, if your water breaks, vaginal bleeding, decreased fetal movement.  Keep next scheduled appt and call [phone] for any concerns or problems.

## 2021-04-17 NOTE — PROGRESS NOTES
[facility]  Becky Shafefr  : 2000  MRN: [id number]  CSN: [id number]    Progress note    Patient is a 20 y.o. female  currently at 30w1d, who presents with presents with some mild dizziness with standing.  Feels better wants to go home requesting note for work.  Up to bathroom without problems.    Blood work indicates mildly anemic          Min/max vitals past 24 hours:  No data recorded   BP  Min: 105/66  Max: 111/79   Pulse  Min: 87  Max: 93   No data recorded    Physical examination lungs clear heart regular rate and rhythm abdomen soft nontender              Lab Results (last 24 hours)     Procedure Component Value Units Date/Time    Comprehensive Metabolic Panel [[id number]]  (Abnormal) Collected: 21 1332    Specimen: Blood Updated: 21 1353     Glucose 80 mg/dL      BUN 7 mg/dL      Creatinine 0.46 mg/dL      Sodium 135 mmol/L      Potassium 3.9 mmol/L      Chloride 107 mmol/L      CO2 24.0 mmol/L      Calcium 9.1 mg/dL      Total Protein 6.1 g/dL      Albumin 3.50 g/dL      ALT (SGPT) 13 U/L      AST (SGOT) 16 U/L      Alkaline Phosphatase 88 U/L      Total Bilirubin 0.2 mg/dL      eGFR Non African Amer >150 mL/min/1.73      Globulin 2.6 gm/dL      A/G Ratio 1.3 g/dL      BUN/Creatinine Ratio 15.2     Anion Gap 4.0 mmol/L     Narrative:      GFR Normal >60  Chronic Kidney Disease <60  Kidney Failure <15      Urine Drug Screen - Urine, Clean Catch [[id number]]  (Normal) Collected: 21 1324    Specimen: Urine, Clean Catch Updated: 21 1343     THC, Screen, Urine Negative     Phencyclidine (PCP), Urine Negative     Cocaine Screen, Urine Negative     Methamphetamine, Ur Negative     Opiate Screen Negative     Amphetamine Screen, Urine Negative     Benzodiazepine Screen, Urine Negative     Tricyclic Antidepressants Screen Negative     Methadone Screen, Urine Negative     Barbiturates Screen, Urine Negative     Oxycodone Screen, Urine Negative     Propoxyphene Screen  Negative     Buprenorphine, Screen, Urine Negative    Narrative:      Cutoff For Drugs Screened:    Amphetamines               500 ng/ml  Barbiturates               200 ng/ml  Benzodiazepines            150 ng/ml  Cocaine                    150 ng/ml  Methadone                  200 ng/ml  Opiates                    100 ng/ml  Phencyclidine               25 ng/ml  THC                            50 ng/ml  Methamphetamine            500 ng/ml  Tricyclic Antidepressants  300 ng/ml  Oxycodone                  100 ng/ml  Propoxyphene               300 ng/ml  Buprenorphine               10 ng/ml    The normal value for all drugs tested is negative. This report includes unconfirmed screening results, with the cutoff values listed, to be used for medical treatment purposes only.  Unconfirmed results must not be used for non-medical purposes such as employment or legal testing.  Clinical consideration should be applied to any drug of abuse test, particularly when unconfirmed results are used.      CBC & Differential [[id number]]  (Abnormal) Collected: 04/17/21 1332    Specimen: Blood Updated: 04/17/21 1341    Narrative:      The following orders were created for panel order CBC & Differential.  Procedure                               Abnormality         Status                     ---------                               -----------         ------                     Scan Slide[[id number]]                                       In process                 CBC Auto Differential[[id number]]        Abnormal            Final result                 Please view results for these tests on the individual orders.    CBC Auto Differential [[id number]]  (Abnormal) Collected: 04/17/21 1332    Specimen: Blood Updated: 04/17/21 1341     WBC 12.05 10*3/mm3      RBC 3.82 10*6/mm3      Hemoglobin 11.8 g/dL      Hematocrit 34.2 %      MCV 89.5 fL      MCH 30.9 pg      MCHC 34.5 g/dL      RDW 12.9 %      RDW-SD 41.9 fl      MPV 11.5 fL      Platelets  139 10*3/mm3      Neutrophil % 68.6 %      Lymphocyte % 20.1 %      Monocyte % 9.0 %      Eosinophil % 1.2 %      Basophil % 0.3 %      Immature Grans % 0.8 %      Neutrophils, Absolute 8.25 10*3/mm3      Lymphocytes, Absolute 2.42 10*3/mm3      Monocytes, Absolute 1.09 10*3/mm3      Eosinophils, Absolute 0.15 10*3/mm3      Basophils, Absolute 0.04 10*3/mm3      Immature Grans, Absolute 0.10 10*3/mm3      nRBC 0.0 /100 WBC     Scan Slide [[id number]] Collected: 04/17/21 1332    Specimen: Blood Updated: 04/17/21 1338    Urinalysis With Microscopic If Indicated (No Culture) - Urine, Clean Catch [[id number]]  (Abnormal) Collected: 04/17/21 1324    Specimen: Urine, Clean Catch Updated: 04/17/21 1337     Color, UA Yellow     Appearance, UA Cloudy     pH, UA 7.0     Specific Gravity, UA 1.017     Glucose, UA Negative     Ketones, UA Negative     Bilirubin, UA Negative     Blood, UA Negative     Protein, UA Negative     Leuk Esterase, UA Large (3+)     Nitrite, UA Negative     Urobilinogen, UA 1.0 E.U./dL    Urinalysis, Microscopic Only - Urine, Clean Catch [[id number]]  (Abnormal) Collected: 04/17/21 1324    Specimen: Urine, Clean Catch Updated: 04/17/21 1337     RBC, UA 3-5 /HPF      WBC, UA 13-20 /HPF      Bacteria, UA 1+ /HPF      Squamous Epithelial Cells, UA 13-20 /HPF      Hyaline Casts, UA 3-6 /LPF      Methodology Automated Microscopy          Imaging Results (Last 24 Hours)     ** No results found for the last 24 hours. **          Assessment  Plan   1. Dizziness in pregnancy work-up thus far negative symptoms seem to be mild patient wants discharge feel stable for discharge will send in iron to her pharmacy.  If symptoms recur to call office to be worked in          This document has been electronically signed by Bhargav Bond MD on April 17, 2021 14:19 CDT

## 2021-04-17 NOTE — NURSING NOTE
Pt up to bathroom out of bed quickly without assist, ambulates to bathroom, steady gate and no signs of dizziness

## 2021-04-29 ENCOUNTER — HOSPITAL ENCOUNTER (INPATIENT)
Facility: HOSPITAL | Age: 21
LOS: 3 days | Discharge: HOME | End: 2021-05-02
Attending: OBSTETRICS & GYNECOLOGY | Admitting: OBSTETRICS & GYNECOLOGY

## 2021-04-29 ENCOUNTER — APPOINTMENT (OUTPATIENT)
Dept: ULTRASOUND IMAGING | Facility: HOSPITAL | Age: 21
End: 2021-04-29

## 2021-04-29 PROBLEM — O46.90 VAGINAL BLEEDING DURING PREGNANCY: Status: ACTIVE | Noted: 2021-04-29

## 2021-04-29 LAB
ALBUMIN SERPL-MCNC: 3.9 G/DL (ref 3.5–5.2)
ALBUMIN/GLOB SERPL: 1.6 G/DL
ALP SERPL-CCNC: 105 U/L (ref 39–117)
ALT SERPL W P-5'-P-CCNC: 19 U/L (ref 1–33)
ANION GAP SERPL CALCULATED.3IONS-SCNC: 10 MMOL/L (ref 5–15)
APTT PPP: 24.5 SECONDS (ref 20–40.3)
AST SERPL-CCNC: 17 U/L (ref 1–32)
BACTERIA UR QL AUTO: ABNORMAL /HPF
BASOPHILS # BLD AUTO: 0.06 10*3/MM3 (ref 0–0.2)
BASOPHILS NFR BLD AUTO: 0.3 % (ref 0–1.5)
BILIRUB SERPL-MCNC: 0.2 MG/DL (ref 0–1.2)
BILIRUB UR QL STRIP: NEGATIVE
BUN SERPL-MCNC: 5 MG/DL (ref 6–20)
BUN/CREAT SERPL: 10.9 (ref 7–25)
CALCIUM SPEC-SCNC: 9 MG/DL (ref 8.6–10.5)
CHLORIDE SERPL-SCNC: 107 MMOL/L (ref 98–107)
CLARITY UR: ABNORMAL
CO2 SERPL-SCNC: 20 MMOL/L (ref 22–29)
COLOR UR: ABNORMAL
CREAT SERPL-MCNC: 0.46 MG/DL (ref 0.57–1)
DEPRECATED RDW RBC AUTO: 42.5 FL (ref 37–54)
EOSINOPHIL # BLD AUTO: 0.13 10*3/MM3 (ref 0–0.4)
EOSINOPHIL NFR BLD AUTO: 0.7 % (ref 0.3–6.2)
ERYTHROCYTE [DISTWIDTH] IN BLOOD BY AUTOMATED COUNT: 13.3 % (ref 12.3–15.4)
FIBRINOGEN PPP-MCNC: 433 MG/DL (ref 228–514)
GFR SERPL CREATININE-BSD FRML MDRD: >150 ML/MIN/1.73
GLOBULIN UR ELPH-MCNC: 2.4 GM/DL
GLUCOSE SERPL-MCNC: 81 MG/DL (ref 65–99)
GLUCOSE UR STRIP-MCNC: NEGATIVE MG/DL
HCT VFR BLD AUTO: 33.6 % (ref 34–46.6)
HGB BLD-MCNC: 11.7 G/DL (ref 12–15.9)
HGB UR QL STRIP.AUTO: ABNORMAL
HOLD SPECIMEN: NORMAL
HYALINE CASTS UR QL AUTO: ABNORMAL /LPF
HYPOCHROMIA BLD QL: NORMAL
IMM GRANULOCYTES # BLD AUTO: 0.18 10*3/MM3 (ref 0–0.05)
IMM GRANULOCYTES NFR BLD AUTO: 1 % (ref 0–0.5)
INR PPP: 0.93 (ref 0.8–1.2)
KETONES UR QL STRIP: NEGATIVE
LEUKOCYTE ESTERASE UR QL STRIP.AUTO: ABNORMAL
LYMPHOCYTES # BLD AUTO: 2.62 10*3/MM3 (ref 0.7–3.1)
LYMPHOCYTES NFR BLD AUTO: 14.8 % (ref 19.6–45.3)
MCH RBC QN AUTO: 30.8 PG (ref 26.6–33)
MCHC RBC AUTO-ENTMCNC: 34.8 G/DL (ref 31.5–35.7)
MCV RBC AUTO: 88.4 FL (ref 79–97)
MONOCYTES # BLD AUTO: 1.26 10*3/MM3 (ref 0.1–0.9)
MONOCYTES NFR BLD AUTO: 7.1 % (ref 5–12)
NEUTROPHILS NFR BLD AUTO: 13.48 10*3/MM3 (ref 1.7–7)
NEUTROPHILS NFR BLD AUTO: 76.1 % (ref 42.7–76)
NITRITE UR QL STRIP: NEGATIVE
NRBC BLD AUTO-RTO: 0 /100 WBC (ref 0–0.2)
PH UR STRIP.AUTO: 6 [PH] (ref 5–9)
PLATELET # BLD AUTO: 135 10*3/MM3 (ref 140–450)
PMV BLD AUTO: 12 FL (ref 6–12)
POTASSIUM SERPL-SCNC: 3.7 MMOL/L (ref 3.5–5.2)
PROT SERPL-MCNC: 6.3 G/DL (ref 6–8.5)
PROT UR QL STRIP: ABNORMAL
PROTHROMBIN TIME: 12.8 SECONDS (ref 11.1–15.3)
RBC # BLD AUTO: 3.8 10*6/MM3 (ref 3.77–5.28)
RBC # UR: ABNORMAL /HPF
REF LAB TEST METHOD: ABNORMAL
SMALL PLATELETS BLD QL SMEAR: NORMAL
SODIUM SERPL-SCNC: 137 MMOL/L (ref 136–145)
SP GR UR STRIP: 1.01 (ref 1–1.03)
SQUAMOUS #/AREA URNS HPF: ABNORMAL /HPF
UROBILINOGEN UR QL STRIP: ABNORMAL
WBC # BLD AUTO: 17.73 10*3/MM3 (ref 3.4–10.8)
WBC MORPH BLD: NORMAL
WBC UR QL AUTO: ABNORMAL /HPF
WHOLE BLOOD HOLD SPECIMEN: NORMAL

## 2021-04-29 PROCEDURE — 76815 OB US LIMITED FETUS(S): CPT

## 2021-04-29 PROCEDURE — 76819 FETAL BIOPHYS PROFIL W/O NST: CPT

## 2021-04-29 PROCEDURE — 25010000002 BETAMETHASONE ACET & SOD PHOS PER 4 MG: Performed by: OBSTETRICS & GYNECOLOGY

## 2021-04-29 PROCEDURE — 81001 URINALYSIS AUTO W/SCOPE: CPT | Performed by: OBSTETRICS & GYNECOLOGY

## 2021-04-29 PROCEDURE — 85384 FIBRINOGEN ACTIVITY: CPT | Performed by: OBSTETRICS & GYNECOLOGY

## 2021-04-29 PROCEDURE — 99222 1ST HOSP IP/OBS MODERATE 55: CPT | Performed by: OBSTETRICS & GYNECOLOGY

## 2021-04-29 PROCEDURE — 59025 FETAL NON-STRESS TEST: CPT

## 2021-04-29 PROCEDURE — 85025 COMPLETE CBC W/AUTO DIFF WBC: CPT | Performed by: OBSTETRICS & GYNECOLOGY

## 2021-04-29 PROCEDURE — 59025 FETAL NON-STRESS TEST: CPT | Performed by: OBSTETRICS & GYNECOLOGY

## 2021-04-29 PROCEDURE — 36415 COLL VENOUS BLD VENIPUNCTURE: CPT | Performed by: OBSTETRICS & GYNECOLOGY

## 2021-04-29 PROCEDURE — 80053 COMPREHEN METABOLIC PANEL: CPT | Performed by: OBSTETRICS & GYNECOLOGY

## 2021-04-29 PROCEDURE — 85730 THROMBOPLASTIN TIME PARTIAL: CPT | Performed by: OBSTETRICS & GYNECOLOGY

## 2021-04-29 PROCEDURE — 85007 BL SMEAR W/DIFF WBC COUNT: CPT | Performed by: OBSTETRICS & GYNECOLOGY

## 2021-04-29 PROCEDURE — 85610 PROTHROMBIN TIME: CPT | Performed by: OBSTETRICS & GYNECOLOGY

## 2021-04-29 RX ORDER — ONDANSETRON 4 MG/1
4 TABLET, FILM COATED ORAL EVERY 8 HOURS PRN
Status: DISCONTINUED | OUTPATIENT
Start: 2021-04-29 | End: 2021-05-02 | Stop reason: HOSPADM

## 2021-04-29 RX ORDER — LIDOCAINE HYDROCHLORIDE 10 MG/ML
5 INJECTION, SOLUTION EPIDURAL; INFILTRATION; INTRACAUDAL; PERINEURAL AS NEEDED
Status: DISCONTINUED | OUTPATIENT
Start: 2021-04-29 | End: 2021-04-30 | Stop reason: HOSPADM

## 2021-04-29 RX ORDER — ONDANSETRON 2 MG/ML
4 INJECTION INTRAMUSCULAR; INTRAVENOUS EVERY 8 HOURS PRN
Status: DISCONTINUED | OUTPATIENT
Start: 2021-04-29 | End: 2021-05-02 | Stop reason: HOSPADM

## 2021-04-29 RX ORDER — SODIUM CHLORIDE 0.9 % (FLUSH) 0.9 %
3 SYRINGE (ML) INJECTION EVERY 12 HOURS SCHEDULED
Status: DISCONTINUED | OUTPATIENT
Start: 2021-04-29 | End: 2021-04-30 | Stop reason: HOSPADM

## 2021-04-29 RX ORDER — ACETAMINOPHEN 325 MG/1
650 TABLET ORAL EVERY 4 HOURS PRN
Status: DISCONTINUED | OUTPATIENT
Start: 2021-04-29 | End: 2021-04-30 | Stop reason: HOSPADM

## 2021-04-29 RX ORDER — NIFEDIPINE 10 MG/1
20 CAPSULE ORAL ONCE
Status: COMPLETED | OUTPATIENT
Start: 2021-04-29 | End: 2021-04-29

## 2021-04-29 RX ORDER — SODIUM CHLORIDE 0.9 % (FLUSH) 0.9 %
3-10 SYRINGE (ML) INJECTION AS NEEDED
Status: DISCONTINUED | OUTPATIENT
Start: 2021-04-29 | End: 2021-04-30 | Stop reason: HOSPADM

## 2021-04-29 RX ORDER — CEPHALEXIN 500 MG/1
500 CAPSULE ORAL EVERY 6 HOURS SCHEDULED
Status: DISCONTINUED | OUTPATIENT
Start: 2021-04-30 | End: 2021-05-02 | Stop reason: HOSPADM

## 2021-04-29 RX ORDER — BETAMETHASONE SODIUM PHOSPHATE AND BETAMETHASONE ACETATE 3; 3 MG/ML; MG/ML
12 INJECTION, SUSPENSION INTRA-ARTICULAR; INTRALESIONAL; INTRAMUSCULAR; SOFT TISSUE EVERY 24 HOURS
Status: COMPLETED | OUTPATIENT
Start: 2021-04-29 | End: 2021-04-30

## 2021-04-29 RX ORDER — SODIUM CHLORIDE, SODIUM LACTATE, POTASSIUM CHLORIDE, CALCIUM CHLORIDE 600; 310; 30; 20 MG/100ML; MG/100ML; MG/100ML; MG/100ML
125 INJECTION, SOLUTION INTRAVENOUS CONTINUOUS
Status: DISCONTINUED | OUTPATIENT
Start: 2021-04-29 | End: 2021-04-30

## 2021-04-29 RX ORDER — NIFEDIPINE 10 MG/1
10 CAPSULE ORAL EVERY 8 HOURS SCHEDULED
Status: DISCONTINUED | OUTPATIENT
Start: 2021-04-30 | End: 2021-05-02

## 2021-04-29 RX ADMIN — NIFEDIPINE 20 MG: 10 CAPSULE ORAL at 22:09

## 2021-04-29 RX ADMIN — BETAMETHASONE SODIUM PHOSPHATE AND BETAMETHASONE ACETATE 12 MG: 3; 3 INJECTION, SUSPENSION INTRA-ARTICULAR; INTRALESIONAL; INTRAMUSCULAR at 22:12

## 2021-04-29 RX ADMIN — CEPHALEXIN 500 MG: 500 CAPSULE ORAL at 23:03

## 2021-04-29 RX ADMIN — SODIUM CHLORIDE, POTASSIUM CHLORIDE, SODIUM LACTATE AND CALCIUM CHLORIDE 125 ML/HR: 600; 310; 30; 20 INJECTION, SOLUTION INTRAVENOUS at 22:08

## 2021-04-30 LAB
DEPRECATED RDW RBC AUTO: 43.7 FL (ref 37–54)
ERYTHROCYTE [DISTWIDTH] IN BLOOD BY AUTOMATED COUNT: 13.4 % (ref 12.3–15.4)
HCT VFR BLD AUTO: 33.5 % (ref 34–46.6)
HGB BLD-MCNC: 12 G/DL (ref 12–15.9)
HOLD SPECIMEN: NORMAL
MCH RBC QN AUTO: 32.1 PG (ref 26.6–33)
MCHC RBC AUTO-ENTMCNC: 35.8 G/DL (ref 31.5–35.7)
MCV RBC AUTO: 89.6 FL (ref 79–97)
PLATELET # BLD AUTO: 144 10*3/MM3 (ref 140–450)
PMV BLD AUTO: 12.2 FL (ref 6–12)
RBC # BLD AUTO: 3.74 10*6/MM3 (ref 3.77–5.28)
WBC # BLD AUTO: 17.84 10*3/MM3 (ref 3.4–10.8)

## 2021-04-30 PROCEDURE — 85027 COMPLETE CBC AUTOMATED: CPT | Performed by: OBSTETRICS & GYNECOLOGY

## 2021-04-30 PROCEDURE — 59025 FETAL NON-STRESS TEST: CPT

## 2021-04-30 PROCEDURE — 25010000002 BETAMETHASONE ACET & SOD PHOS PER 4 MG: Performed by: OBSTETRICS & GYNECOLOGY

## 2021-04-30 PROCEDURE — 99232 SBSQ HOSP IP/OBS MODERATE 35: CPT | Performed by: OBSTETRICS & GYNECOLOGY

## 2021-04-30 PROCEDURE — 59025 FETAL NON-STRESS TEST: CPT | Performed by: OBSTETRICS & GYNECOLOGY

## 2021-04-30 RX ADMIN — ACETAMINOPHEN 650 MG: 325 TABLET, FILM COATED ORAL at 16:57

## 2021-04-30 RX ADMIN — NIFEDIPINE 10 MG: 10 CAPSULE ORAL at 14:27

## 2021-04-30 RX ADMIN — SODIUM CHLORIDE, POTASSIUM CHLORIDE, SODIUM LACTATE AND CALCIUM CHLORIDE 125 ML/HR: 600; 310; 30; 20 INJECTION, SOLUTION INTRAVENOUS at 05:45

## 2021-04-30 RX ADMIN — CEPHALEXIN 500 MG: 500 CAPSULE ORAL at 18:16

## 2021-04-30 RX ADMIN — CEPHALEXIN 500 MG: 500 CAPSULE ORAL at 05:57

## 2021-04-30 RX ADMIN — NIFEDIPINE 10 MG: 10 CAPSULE ORAL at 22:44

## 2021-04-30 RX ADMIN — BETAMETHASONE SODIUM PHOSPHATE AND BETAMETHASONE ACETATE 12 MG: 3; 3 INJECTION, SUSPENSION INTRA-ARTICULAR; INTRALESIONAL; INTRAMUSCULAR at 22:44

## 2021-04-30 RX ADMIN — SODIUM CHLORIDE, POTASSIUM CHLORIDE, SODIUM LACTATE AND CALCIUM CHLORIDE 125 ML/HR: 600; 310; 30; 20 INJECTION, SOLUTION INTRAVENOUS at 13:14

## 2021-04-30 RX ADMIN — CEPHALEXIN 500 MG: 500 CAPSULE ORAL at 11:54

## 2021-04-30 RX ADMIN — NIFEDIPINE 10 MG: 10 CAPSULE ORAL at 05:49

## 2021-05-01 LAB
DEPRECATED RDW RBC AUTO: 44 FL (ref 37–54)
ERYTHROCYTE [DISTWIDTH] IN BLOOD BY AUTOMATED COUNT: 13.5 % (ref 12.3–15.4)
HCT VFR BLD AUTO: 33 % (ref 34–46.6)
HGB BLD-MCNC: 11.6 G/DL (ref 12–15.9)
MCH RBC QN AUTO: 32 PG (ref 26.6–33)
MCHC RBC AUTO-ENTMCNC: 35.2 G/DL (ref 31.5–35.7)
MCV RBC AUTO: 90.9 FL (ref 79–97)
PLATELET # BLD AUTO: 144 10*3/MM3 (ref 140–450)
PMV BLD AUTO: 12.3 FL (ref 6–12)
RBC # BLD AUTO: 3.63 10*6/MM3 (ref 3.77–5.28)
WBC # BLD AUTO: 21.38 10*3/MM3 (ref 3.4–10.8)

## 2021-05-01 PROCEDURE — 59025 FETAL NON-STRESS TEST: CPT

## 2021-05-01 PROCEDURE — 59025 FETAL NON-STRESS TEST: CPT | Performed by: OBSTETRICS & GYNECOLOGY

## 2021-05-01 PROCEDURE — 85027 COMPLETE CBC AUTOMATED: CPT | Performed by: OBSTETRICS & GYNECOLOGY

## 2021-05-01 PROCEDURE — 99232 SBSQ HOSP IP/OBS MODERATE 35: CPT | Performed by: OBSTETRICS & GYNECOLOGY

## 2021-05-01 RX ORDER — CALCIUM CARBONATE 200(500)MG
2 TABLET,CHEWABLE ORAL 3 TIMES DAILY PRN
Status: DISCONTINUED | OUTPATIENT
Start: 2021-05-01 | End: 2021-05-02 | Stop reason: HOSPADM

## 2021-05-01 RX ADMIN — CEPHALEXIN 500 MG: 500 CAPSULE ORAL at 01:19

## 2021-05-01 RX ADMIN — CEPHALEXIN 500 MG: 500 CAPSULE ORAL at 08:00

## 2021-05-01 RX ADMIN — CEPHALEXIN 500 MG: 500 CAPSULE ORAL at 13:52

## 2021-05-01 RX ADMIN — NIFEDIPINE 10 MG: 10 CAPSULE ORAL at 06:10

## 2021-05-01 RX ADMIN — NIFEDIPINE 10 MG: 10 CAPSULE ORAL at 13:52

## 2021-05-01 RX ADMIN — CALCIUM CARBONATE (ANTACID) CHEW TAB 500 MG 2 TABLET: 500 CHEW TAB at 21:24

## 2021-05-01 RX ADMIN — CEPHALEXIN 500 MG: 500 CAPSULE ORAL at 19:50

## 2021-05-01 NOTE — NON STRESS TEST
Becky Shaffer, a  at 32w1d with an LILIANA of 2021, by Ultrasound, was seen at [facility] for a nonstress test.    Chief Complaint   Patient presents with   • Vaginal Bleeding     1830 tonight, bright red blood. Positive fetal movement. Lower abdominal pain sharp shooting, intermittent.        Patient Active Problem List   Diagnosis   • Maternal tobacco use in first trimester   • Nausea and vomiting during pregnancy prior to 22 weeks gestation   • Encounter for supervision of normal first pregnancy in third trimester   • Vaginal bleeding during pregnancy       Start Time:1710  Stop Time:1730    Interpretation A  Nonstress Test Interpretation A: Reactive (21 : Марина Rodrigeuz, RN)  Comments A: reviewed with LUZ MARINA Chan RN (21 : Марина Rodriguez, RN)

## 2021-05-01 NOTE — NON STRESS TEST
Becky Shaffer, a  at 32w1d with an LILIANA of 2021, by Ultrasound, was seen at [facility] for a nonstress test.    Chief Complaint   Patient presents with   • Vaginal Bleeding     1830 tonight, bright red blood. Positive fetal movement. Lower abdominal pain sharp shooting, intermittent.        Patient Active Problem List   Diagnosis   • Maternal tobacco use in first trimester   • Nausea and vomiting during pregnancy prior to 22 weeks gestation   • Encounter for supervision of normal first pregnancy in third trimester   • Vaginal bleeding during pregnancy       Start Time: 1335  Stop Time: 1355    Interpretation A  Nonstress Test Interpretation A: Reactive (21 : Марина Rodriguez, RN)  Comments A: reviewed with LUZ MARINA Chan RN (21 : Марина Rodriguez, RN)

## 2021-05-01 NOTE — NON STRESS TEST
Becky Shaffer, a  at 32w0d with an LILIANA of 2021, by Ultrasound, was seen at [facility] for a nonstress test.    Chief Complaint   Patient presents with   • Vaginal Bleeding     1830 tonight, bright red blood. Positive fetal movement. Lower abdominal pain sharp shooting, intermittent.        Patient Active Problem List   Diagnosis   • Maternal tobacco use in first trimester   • Nausea and vomiting during pregnancy prior to 22 weeks gestation   • Encounter for supervision of normal first pregnancy in third trimester   • Vaginal bleeding during pregnancy       Start Time:   Stop Time:     Interpretation A  Nonstress Test Interpretation A: Reactive (21 : Tessy Omalley, RN)  Comments A: Reviewed with ALYCE Cuevas RN (21 : Tessy Omalley, RN)        Bleeding has decreased in amount since admission, contractions have stopped, abdominal pain has improved since admission also, FHR reassuring.

## 2021-05-01 NOTE — PROGRESS NOTES
[facility]  Becky Shaffer  : 2000  MRN: [id number]  CSN: [id number]    Hospital day#2  Subjective   Patient was admitted to the hospital for heavy vaginal bleeding abdominal pain and contractions at 31 weeks.  Patient has been given steroids received second shot last night, was also started on nifedipine for toco lysis.  Since admission contractions have went away and pain has improved significantly.  Patient still is having some light bleeding but this has also decreased significantly.  Patient has no concerns or worries at this time today.  Fetal monitoring has remained very reassuring throughout entire hospital stay     Objective     Min/max vitals past 24 hours:   Temp  Min: 97.4 °F (36.3 °C)  Max: 98.2 °F (36.8 °C)  BP  Min: 80/45  Max: 121/69  Pulse  Min: 82  Max: 93  Pulse  Min: 82  Max: 93        Abdomen: soft, nontender, bowel sounds normal, no masses   fundus firm and nontender   Calves: Nontender, no cords palpable   Pelvic: deferred     Lab Results   Component Value Date    WBC 21.38 (H) 2021    HGB 11.6 (L) 2021    HCT 33.0 (L) 2021    MCV 90.9 2021     2021    RH Positive 2020    HEPBSAG Non-Reactive 2020        Assessment   1. Hospital day #2 with with vaginal bleeding concerning for abruption.  I feel that at this time a edge of the placenta may have slightly abrupted which is causing the bleeding fetal monitoring however has remained stable.  Patient baby overall doing well at this time.     Plan   1. Continue inpatient monitoring  2. Continue to time daily NSTs  3. We will continue nifedipine until steroid complete will likely DC this medication this evening unless patient's blood pressures do not tolerate medication may need to stop earlier.  4. Will monitor tomorrow off of nifedipine if no contractions and bleeding continues to be small will consider possible discharge  5. Continue regular diet  6. Modified  bedrest          This document has been electronically signed by Jerry Hargrove DO on May 1, 2021 10:23 CDT

## 2021-05-01 NOTE — PLAN OF CARE
Goal Outcome Evaluation:  Plan of Care Reviewed With: patient  Progress: improving  Outcome Summary: vss; no bleeding noted on this shift; NST reactive x2; pt complains of tightness and sharp pain occasionally in lower abdomen; ambulates in room to void.

## 2021-05-01 NOTE — PLAN OF CARE
Problem: Adult Inpatient Plan of Care  Goal: Plan of Care Review  Outcome: Ongoing, Progressing  Flowsheets (Taken 5/1/2021 3557)  Progress: improving  Plan of Care Reviewed With: patient  Outcome Summary: No complaints of bleeding, sleeping between care.   Goal Outcome Evaluation:  Plan of Care Reviewed With: patient  Progress: improving  Outcome Summary: No complaints of bleeding, sleeping between care.

## 2021-05-02 VITALS
SYSTOLIC BLOOD PRESSURE: 121 MMHG | RESPIRATION RATE: 18 BRPM | HEART RATE: 98 BPM | OXYGEN SATURATION: 99 % | TEMPERATURE: 97.7 F | WEIGHT: 120 LBS | BODY MASS INDEX: 23.44 KG/M2 | DIASTOLIC BLOOD PRESSURE: 60 MMHG

## 2021-05-02 LAB
DEPRECATED RDW RBC AUTO: 45.1 FL (ref 37–54)
ERYTHROCYTE [DISTWIDTH] IN BLOOD BY AUTOMATED COUNT: 13.6 % (ref 12.3–15.4)
HCT VFR BLD AUTO: 31.9 % (ref 34–46.6)
HGB BLD-MCNC: 11.1 G/DL (ref 12–15.9)
MCH RBC QN AUTO: 31.8 PG (ref 26.6–33)
MCHC RBC AUTO-ENTMCNC: 34.8 G/DL (ref 31.5–35.7)
MCV RBC AUTO: 91.4 FL (ref 79–97)
PLATELET # BLD AUTO: 131 10*3/MM3 (ref 140–450)
PMV BLD AUTO: 12.2 FL (ref 6–12)
RBC # BLD AUTO: 3.49 10*6/MM3 (ref 3.77–5.28)
WBC # BLD AUTO: 20.75 10*3/MM3 (ref 3.4–10.8)

## 2021-05-02 PROCEDURE — 85027 COMPLETE CBC AUTOMATED: CPT | Performed by: OBSTETRICS & GYNECOLOGY

## 2021-05-02 PROCEDURE — 59025 FETAL NON-STRESS TEST: CPT | Performed by: OBSTETRICS & GYNECOLOGY

## 2021-05-02 PROCEDURE — 59025 FETAL NON-STRESS TEST: CPT

## 2021-05-02 PROCEDURE — 99238 HOSP IP/OBS DSCHRG MGMT 30/<: CPT | Performed by: OBSTETRICS & GYNECOLOGY

## 2021-05-02 RX ORDER — CEPHALEXIN 500 MG/1
500 CAPSULE ORAL EVERY 6 HOURS SCHEDULED
Qty: 10 CAPSULE | Refills: 0 | Status: SHIPPED | OUTPATIENT
Start: 2021-05-02 | End: 2021-05-05

## 2021-05-02 RX ADMIN — CEPHALEXIN 500 MG: 500 CAPSULE ORAL at 01:11

## 2021-05-02 RX ADMIN — CEPHALEXIN 500 MG: 500 CAPSULE ORAL at 09:32

## 2021-05-02 NOTE — NON STRESS TEST
Becky Shaffer, a  at 32w2d with an LILIANA of 2021, by Ultrasound, was seen at [facility] for a nonstress test.    Chief Complaint   Patient presents with   • Vaginal Bleeding     1830 tonight, bright red blood. Positive fetal movement. Lower abdominal pain sharp shooting, intermittent.        Patient Active Problem List   Diagnosis   • Maternal tobacco use in first trimester   • Nausea and vomiting during pregnancy prior to 22 weeks gestation   • Encounter for supervision of normal first pregnancy in third trimester   • Vaginal bleeding during pregnancy       Start Time: 935  Stop Time: 955    Interpretation A  Nonstress Test Interpretation A: Reactive (21 1014 : Mel Lea, RN)  Comments A: reviewed with Dr Hargrove (21 1014 : Mel Lea, RN)      No bleeding, reports fetal movement, denies contractions, or pain

## 2021-05-02 NOTE — PLAN OF CARE
Problem: Adult Inpatient Plan of Care  Goal: Plan of Care Review  Outcome: Ongoing, Progressing  Flowsheets  Taken 5/2/2021 0423 by Tessy Omalley RN  Outcome Summary: VSS, no bleeding reported during this shift, denies pain during this shift, slept well, Nightly NST reactive with no contractions. Procardia held per MD order. Ambulates in room.  Taken 5/1/2021 1821 by Марина Rodriguez RN  Progress: improving  Plan of Care Reviewed With: patient   Goal Outcome Evaluation:  Plan of Care Reviewed With: patient  Progress: no change  Outcome Summary: VSS, no bleeding reported during this shift, denies pain during this shift, slept well, Nightly NST reactive with no contractions. Procardia held per MD order. Ambulates in room.

## 2021-05-02 NOTE — NON STRESS TEST
Becky Shaffer, a  at 32w1d with an LILIANA of 2021, by Ultrasound, was seen at [facility] for a nonstress test.    Chief Complaint   Patient presents with   • Vaginal Bleeding     1830 tonight, bright red blood. Positive fetal movement. Lower abdominal pain sharp shooting, intermittent.        Patient Active Problem List   Diagnosis   • Maternal tobacco use in first trimester   • Nausea and vomiting during pregnancy prior to 22 weeks gestation   • Encounter for supervision of normal first pregnancy in third trimester   • Vaginal bleeding during pregnancy       Start Time:   Stop Time:     Interpretation A  Nonstress Test Interpretation A: Reactive (21 : Tessy Omalley, RN)  Comments A: Reviewed with ZHANE Riojas RN (21 : Tessy Omalley, RN)        Pt doing well. No complaints of pain or contraction. No vaginal bleeding reported today. Procardia held per MD order.

## 2021-05-02 NOTE — PROGRESS NOTES
[facility]  Becky Shaffer  : 2000  MRN: [id number]  CSN: [id number]    Hospital day#3  Subjective   Patient was admitted to the hospital for vaginal bleeding pelvic pain and contractions.  Today patient is stable.  States that she is no longer having any bleeding and is no longer having any abdominal pain.  NSTs have been reactive.  Patient overall is feeling very well at this time.  Baby is moving appropriately.  And she is not feeling any contractions     Objective     Min/max vitals past 24 hours:   Temp  Min: 97.8 °F (36.6 °C)  Max: 98.7 °F (37.1 °C)  BP  Min: 109/52  Max: 115/69  Pulse  Min: 81  Max: 96  Pulse  Min: 81  Max: 96        Abdomen: soft, nontender, bowel sounds normal, no masses   fundus firm and nontender   Calves: Nontender, no cords palpable   Pelvic: deferred     Lab Results   Component Value Date    WBC 20.75 (H) 2021    HGB 11.1 (L) 2021    HCT 31.9 (L) 2021    MCV 91.4 2021     (L) 2021    RH Positive 2020    HEPBSAG Non-Reactive 2020        Assessment   1. Hospital day #3 with with heavy vaginal bleeding and abdominal pain concerning for possible abruption.  Mother and baby have remained stable during entire hospitalization and bleeding has subsided.  Bleeding was likely secondary to a very small placental edge abruption with fetus in mother both stable I feel that discharge home is reasonable at this time.     Plan   1. Discharged home today  2. Follow-up tomorrow at scheduled OB appointment  3. Strict return precautions for bleeding contractions or severe abdominal pain  4. Patient is now steroid complete  5. Has not been on toco lysis for 12 hours with no contractions.          This document has been electronically signed by Jerry Hargrove DO on May 2, 2021 09:59 CDT

## 2021-05-02 NOTE — DISCHARGE SUMMARY
[facility]  Becky Shaffer  : 2000  MRN: [id number]  CSN: [id number]    Discharge Summary      Date of Admission: 2021   Date of Discharge: 2021   Discharge Diagnosis: 1.  Heavy vaginal bleeding and pelvic pain with contractions at 32 weeks concern for possible abruption.  Urinary tract infection   Procedures Performed:    [facility], [place], operated by [facility]   Hospital Course:  Patient was admitted to the hospital for continuous monitoring initially.  Received betamethasone for fetal lung maturity.  Started on toco lysis.  And was treated for urinary tract infection.  During 3 days in the hospital.  Bleeding slowed and subsided.  Contractions stopped.  Patient remained stable.  On hospital day #3 patient desired to be discharged to home, she was ambulating without difficulty, tolerating a regular diet.  Urinating without difficulty.  No further bleeding and no further contractions.   Pending Studies:  Labs:  None  Lab Results (last 72 hours)     Procedure Component Value Units Date/Time    CBC (No Diff) [[id number]]  (Abnormal) Collected: 21 0636    Specimen: Blood Updated: 21 0717     WBC 20.75 10*3/mm3      RBC 3.49 10*6/mm3      Hemoglobin 11.1 g/dL      Hematocrit 31.9 %      MCV 91.4 fL      MCH 31.8 pg      MCHC 34.8 g/dL      RDW 13.6 %      RDW-SD 45.1 fl      MPV 12.2 fL      Platelets 131 10*3/mm3     CBC (No Diff) [[id number]]  (Abnormal) Collected: 21 0614    Specimen: Blood Updated: 21 0624     WBC 21.38 10*3/mm3      RBC 3.63 10*6/mm3      Hemoglobin 11.6 g/dL      Hematocrit 33.0 %      MCV 90.9 fL      MCH 32.0 pg      MCHC 35.2 g/dL      RDW 13.5 %      RDW-SD 44.0 fl      MPV 12.3 fL      Platelets 144 10*3/mm3     Extra Tubes [[id number]] Collected: 21 0354    Specimen: Blood, Venous Line Updated: 21 0501    Narrative:      The following orders were created for panel order Extra Tubes.  Procedure                               Abnormality         Status                      ---------                               -----------         ------                     Green Top (Gel)[[id number]]                                  Final result                 Please view results for these tests on the individual orders.    Green Top (Gel) [[id number]] Collected: 04/30/21 0354    Specimen: Blood Updated: 04/30/21 0501     Extra Tube Hold for add-ons.     Comment: Auto resulted.       CBC (No Diff) [[id number]]  (Abnormal) Collected: 04/30/21 0322    Specimen: Blood Updated: 04/30/21 0358     WBC 17.84 10*3/mm3      RBC 3.74 10*6/mm3      Hemoglobin 12.0 g/dL      Hematocrit 33.5 %      MCV 89.6 fL      MCH 32.1 pg      MCHC 35.8 g/dL      RDW 13.4 %      RDW-SD 43.7 fl      MPV 12.2 fL      Platelets 144 10*3/mm3     Extra Tubes [[id number]] Collected: 04/29/21 2104    Specimen: Blood, Venous Line Updated: 04/29/21 2215    Narrative:      The following orders were created for panel order Extra Tubes.  Procedure                               Abnormality         Status                     ---------                               -----------         ------                     Lavender Top[[id number]]                                     Final result               Gold Top - SST[[id number]]                                   Final result                 Please view results for these tests on the individual orders.    Lavender Top [[id number]] Collected: 04/29/21 2104    Specimen: Blood Updated: 04/29/21 2215     Extra Tube hold for add-on     Comment: Auto resulted       Gold Top - SST [[id number]] Collected: 04/29/21 2104    Specimen: Blood Updated: 04/29/21 2215     Extra Tube Hold for add-ons.     Comment: Auto resulted.       CBC & Differential [[id number]]  (Abnormal) Collected: 04/29/21 2100    Specimen: Blood Updated: 04/29/21 2149    Narrative:      The following orders were created for panel order CBC & Differential.  Procedure                               Abnormality         Status                      ---------                               -----------         ------                     Scan Slide[[id number]]                                       Final result               CBC Auto Differential[[id number]]        Abnormal            Final result                 Please view results for these tests on the individual orders.    Scan Slide [[id number]] Collected: 04/29/21 2100    Specimen: Blood Updated: 04/29/21 2149     Hypochromia Slight/1+     WBC Morphology Normal     Platelet Estimate Decreased    Comprehensive Metabolic Panel [[id number]]  (Abnormal) Collected: 04/29/21 2100    Specimen: Blood Updated: 04/29/21 2126     Glucose 81 mg/dL      BUN 5 mg/dL      Creatinine 0.46 mg/dL      Sodium 137 mmol/L      Potassium 3.7 mmol/L      Chloride 107 mmol/L      CO2 20.0 mmol/L      Calcium 9.0 mg/dL      Total Protein 6.3 g/dL      Albumin 3.90 g/dL      ALT (SGPT) 19 U/L      AST (SGOT) 17 U/L      Alkaline Phosphatase 105 U/L      Total Bilirubin 0.2 mg/dL      eGFR Non African Amer >150 mL/min/1.73      Globulin 2.4 gm/dL      A/G Ratio 1.6 g/dL      BUN/Creatinine Ratio 10.9     Anion Gap 10.0 mmol/L     Narrative:      GFR Normal >60  Chronic Kidney Disease <60  Kidney Failure <15      aPTT [[id number]]  (Normal) Collected: 04/29/21 2100    Specimen: Blood Updated: 04/29/21 2126     PTT 24.5 seconds     Narrative:      The recommended Heparin therapeutic range is 68-97 seconds.    Protime-INR [[id number]]  (Normal) Collected: 04/29/21 2100    Specimen: Blood Updated: 04/29/21 2126     Protime 12.8 Seconds      INR 0.93    Narrative:      Therapeutic range for most indications is 2.0-3.0 INR,  or 2.5-3.5 for mechanical heart valves.    Fibrinogen [[id number]]  (Normal) Collected: 04/29/21 2100    Specimen: Blood Updated: 04/29/21 2126     Fibrinogen 433 mg/dL     CBC Auto Differential [[id number]]  (Abnormal) Collected: 04/29/21 2100    Specimen: Blood Updated: 04/29/21 2113     WBC 17.73 10*3/mm3      RBC 3.80  10*6/mm3      Hemoglobin 11.7 g/dL      Hematocrit 33.6 %      MCV 88.4 fL      MCH 30.8 pg      MCHC 34.8 g/dL      RDW 13.3 %      RDW-SD 42.5 fl      MPV 12.0 fL      Platelets 135 10*3/mm3      Neutrophil % 76.1 %      Lymphocyte % 14.8 %      Monocyte % 7.1 %      Eosinophil % 0.7 %      Basophil % 0.3 %      Immature Grans % 1.0 %      Neutrophils, Absolute 13.48 10*3/mm3      Lymphocytes, Absolute 2.62 10*3/mm3      Monocytes, Absolute 1.26 10*3/mm3      Eosinophils, Absolute 0.13 10*3/mm3      Basophils, Absolute 0.06 10*3/mm3      Immature Grans, Absolute 0.18 10*3/mm3      nRBC 0.0 /100 WBC     Urinalysis, Microscopic Only - Urine, Clean Catch [[id number]]  (Abnormal) Collected: 04/29/21 1956    Specimen: Urine, Clean Catch Updated: 04/29/21 2020     RBC, UA Too Numerous to Count /HPF      WBC, UA 31-50 /HPF      Bacteria, UA 1+ /HPF      Squamous Epithelial Cells, UA 3-5 /HPF      Hyaline Casts, UA None Seen /LPF      Methodology Automated Microscopy    Urinalysis With Microscopic If Indicated (No Culture) - Urine, Clean Catch [[id number]]  (Abnormal) Collected: 04/29/21 1956    Specimen: Urine, Clean Catch Updated: 04/29/21 2019     Color, UA Red     Appearance, UA Slightly Cloudy     pH, UA 6.0     Specific Gravity, UA 1.015     Glucose, UA Negative     Ketones, UA Negative     Bilirubin, UA Negative     Blood, UA Large (3+)     Protein, UA 30 mg/dL (1+)     Leuk Esterase, UA Moderate (2+)     Nitrite, UA Negative     Urobilinogen, UA 2.0 E.U./dL         Condition at Discharge: Stable   Discharge Diet: Diet Instructions     Diet: Regular      Discharge Diet: Regular         Discharge Activity: Activity Instructions     Lifting Restrictions      Type of Restriction: Lifting    Lifting Restrictions: Other    Explain Lifing Restrictions: No lifting more than infant and baby carrier together for 6 weeks.    Pelvic Rest      Nothing in the vagina for 6 weeks to include tampons or intercourse.         Discharge  Medications:    Your medication list      START taking these medications      Instructions Last Dose Given Next Dose Due   cephalexin 500 MG capsule  Commonly known as: KEFLEX      Take 1 capsule by mouth Every 6 (Six) Hours for 10 doses. Indications: Urinary Tract Infection          CONTINUE taking these medications      Instructions Last Dose Given Next Dose Due   ferrous sulfate 325 (65 FE) MG tablet      Take 1 tablet by mouth Daily With Breakfast.       prenatal (CLASSIC) vitamin  tablet  Generic drug: prenatal vitamin      Take 1 tablet by mouth Daily.             Where to Get Your Medications      You can get these medications from any pharmacy    Bring a paper prescription for each of these medications  · cephalexin 500 MG capsule        Discharge Disposition: home   Follow-up: Future Appointments   Date Time Provider Department Center   5/3/2021 10:00 AM [facility] OB RESIDENCY [facility] OB R [facility]AD [facility]            This note has been electronically signed.    Jerry Hargrove, DO  May 2, 2021  10:04 CDT

## 2021-05-03 ENCOUNTER — ROUTINE PRENATAL (OUTPATIENT)
Dept: OBSTETRICS AND GYNECOLOGY | Facility: CLINIC | Age: 21
End: 2021-05-03

## 2021-05-03 VITALS — WEIGHT: 122 LBS | BODY MASS INDEX: 23.83 KG/M2 | DIASTOLIC BLOOD PRESSURE: 64 MMHG | SYSTOLIC BLOOD PRESSURE: 96 MMHG

## 2021-05-03 DIAGNOSIS — O46.93 VAGINAL BLEEDING IN PREGNANCY, THIRD TRIMESTER: ICD-10-CM

## 2021-05-03 DIAGNOSIS — Z3A.32 32 WEEKS GESTATION OF PREGNANCY: Primary | ICD-10-CM

## 2021-05-03 DIAGNOSIS — Z34.03 ENCOUNTER FOR SUPERVISION OF NORMAL FIRST PREGNANCY IN THIRD TRIMESTER: ICD-10-CM

## 2021-05-03 DIAGNOSIS — O21.9 NAUSEA AND VOMITING DURING PREGNANCY PRIOR TO 22 WEEKS GESTATION: ICD-10-CM

## 2021-05-03 DIAGNOSIS — O99.331 MATERNAL TOBACCO USE IN FIRST TRIMESTER: ICD-10-CM

## 2021-05-03 PROCEDURE — 99213 OFFICE O/P EST LOW 20 MIN: CPT | Performed by: FAMILY MEDICINE

## 2021-05-03 NOTE — PROGRESS NOTES
CC: Prenatal visit    Becky Shaffer is a 20 y.o.  at 32w3d.  Was seen on L&D over the weekend for vaginal bleeding & contractions, received BMZx2.  Was sent home with Rx for Keflex but has not yet picked up the prescription, plans to get this after her appointment today. Reports continued irregular contractions, no further vaginal bleeding since hospitalization. Denies LOF..  Reports good FM.    BPP: cephalic, anterior placenta, FHR 124bpm, EFW 1808g, BPP 8/8    BP 96/64   Wt 55.3 kg (122 lb)   LMP 2020 (Approximate)   BMI 23.83 kg/m²   SVE: deferred  Fundal Height (cm): 32 cm  Fetal Heart Rate: 124 US     Problems (from 20 to present)     Problem Noted Resolved    Encounter for supervision of normal first pregnancy in third trimester 2020 by Crystal Wheatley MD No    Overview Addendum 3/22/2021 12:16 PM by Crystal Wheatley MD     A POS/ Rubella equivocal/ GBS @ 36wks  DatinTUS 20 12w5d  Genetics: pending  Tdap: 3/22/21  Anatomy: male, anterior placenta, wnl  1h Glucola: 95  Lab Results   Component Value Date    HGB 12.6 2020    HCT 35.4 2020     2020     Feeding Method: undecided  BC plan: ?           Previous Version    Maternal tobacco use in first trimester 2020 by Guerline Stacy, LYNDA No    Nausea and vomiting during pregnancy prior to 22 weeks gestation 2020 by Guerline Stacy, LYDNA No          A/P: Becky Shaffer is a 20 y.o.  at 32w3d.  - RTC in 1 week; advised to  prescription for keflex. Increase PO fluid intake.   Continue bedrest for 1 week as advised.       Diagnosis Plan   1. 32 weeks gestation of pregnancy     2. Vaginal bleeding in pregnancy, third trimester  MFM OB US MAD   3. Encounter for supervision of normal first pregnancy in third trimester     4. Nausea and vomiting during pregnancy prior to 22 weeks gestation     5. Maternal tobacco use in first  trimester         Signature  Crystal Wheatley MD  [facility] [address], [place] [address]  Office: [phone]      This document has been electronically signed by Crystal Wheatley MD on May 3, 2021 12:01 CDT

## 2021-05-03 NOTE — PAYOR COMM NOTE
"Telma Duff  [facility]  P: [phone]  F: [phone]    [facility]#[id number]    Fiorella Salmeron (20 y.o. Female)     Date of Birth Social Security Number Address Home Phone MRN    2000   [facility] [facility] [place] [place] [address] [phone] [id number]    Yarsani Marital Status          None Single       Admission Date Admission Type Admitting Provider Attending Provider Department, Room/Bed    21 Elective Jerry Hargrove DO  [facility] MOTHER BABY, M766/    Discharge Date Discharge Disposition Discharge Destination        2021 Home or Self Care              Attending Provider: (none)   Allergies: No Known Allergies    Isolation: None   Infection: None   Code Status: Prior    Ht: 152.4 cm (60\")   Wt: 54.4 kg (120 lb)    Admission Cmt: None   Principal Problem: None                Active Insurance as of 2021     Primary Coverage     Payor Plan Insurance Group Employer/Plan Group    [employer] [place] [employer] [place]      Payor Plan Address Payor Plan Phone Number Payor Plan Fax Number Effective Dates    [address] [address] [phone]  2020 - None Entered    [facility] [address]       Subscriber Name Subscriber Birth Date Member ID       FIORELLA SALMERON 2000 [id number]                 Emergency Contacts      (Rel.) Home Phone Work Phone Mobile Phone    victor manuel hernandez (Friend) -- -- [phone]               Discharge Summary      Jerry Hargrove DO at [address] [place] [address]  Fiorella Salmeron  : 2000  MRN: [id number]  CSN: [id number]    Discharge Summary      Date of Admission: 2021   Date of Discharge: 2021   Discharge Diagnosis: 1.  Heavy vaginal bleeding and pelvic pain with contractions at 32 weeks concern for possible abruption.  Urinary tract infection   Procedures Performed:    BPP   Hospital Course:  Patient was admitted to the hospital for continuous " monitoring initially.  Received betamethasone for fetal lung maturity.  Started on toco lysis.  And was treated for urinary tract infection.  During 3 days in the hospital.  Bleeding slowed and subsided.  Contractions stopped.  Patient remained stable.  On hospital day #3 patient desired to be discharged to home, she was ambulating without difficulty, tolerating a regular diet.  Urinating without difficulty.  No further bleeding and no further contractions.   Pending Studies:  Labs:  None  Lab Results (last 72 hours)     Procedure Component Value Units Date/Time    CBC (No Diff) [[id number]]  (Abnormal) Collected: 05/02/21 0636    Specimen: Blood Updated: 05/02/21 0717     WBC 20.75 10*3/mm3      RBC 3.49 10*6/mm3      Hemoglobin 11.1 g/dL      Hematocrit 31.9 %      MCV 91.4 fL      MCH 31.8 pg      MCHC 34.8 g/dL      RDW 13.6 %      RDW-SD 45.1 fl      MPV 12.2 fL      Platelets 131 10*3/mm3     CBC (No Diff) [[id number]]  (Abnormal) Collected: 05/01/21 0614    Specimen: Blood Updated: 05/01/21 0624     WBC 21.38 10*3/mm3      RBC 3.63 10*6/mm3      Hemoglobin 11.6 g/dL      Hematocrit 33.0 %      MCV 90.9 fL      MCH 32.0 pg      MCHC 35.2 g/dL      RDW 13.5 %      RDW-SD 44.0 fl      MPV 12.3 fL      Platelets 144 10*3/mm3     Extra Tubes [[id number]] Collected: 04/30/21 0354    Specimen: Blood, Venous Line Updated: 04/30/21 0501    Narrative:      The following orders were created for panel order Extra Tubes.  Procedure                               Abnormality         Status                     ---------                               -----------         ------                     Green Top (Gel)[[id number]]                                  Final result                 Please view results for these tests on the individual orders.    Green Top (Gel) [[id number]] Collected: 04/30/21 0354    Specimen: Blood Updated: 04/30/21 0501     Extra Tube Hold for add-ons.     Comment: Auto resulted.       CBC (No Diff)  [[id number]]  (Abnormal) Collected: 04/30/21 0322    Specimen: Blood Updated: 04/30/21 0358     WBC 17.84 10*3/mm3      RBC 3.74 10*6/mm3      Hemoglobin 12.0 g/dL      Hematocrit 33.5 %      MCV 89.6 fL      MCH 32.1 pg      MCHC 35.8 g/dL      RDW 13.4 %      RDW-SD 43.7 fl      MPV 12.2 fL      Platelets 144 10*3/mm3     Extra Tubes [[id number]] Collected: 04/29/21 2104    Specimen: Blood, Venous Line Updated: 04/29/21 2215    Narrative:      The following orders were created for panel order Extra Tubes.  Procedure                               Abnormality         Status                     ---------                               -----------         ------                     Lavender Top[[id number]]                                     Final result               Gold Top - SST[[id number]]                                   Final result                 Please view results for these tests on the individual orders.    Lavender Top [[id number]] Collected: 04/29/21 2104    Specimen: Blood Updated: 04/29/21 2215     Extra Tube hold for add-on     Comment: Auto resulted       Gold Top - SST [[id number]] Collected: 04/29/21 2104    Specimen: Blood Updated: 04/29/21 2215     Extra Tube Hold for add-ons.     Comment: Auto resulted.       CBC & Differential [[id number]]  (Abnormal) Collected: 04/29/21 2100    Specimen: Blood Updated: 04/29/21 2149    Narrative:      The following orders were created for panel order CBC & Differential.  Procedure                               Abnormality         Status                     ---------                               -----------         ------                     Scan Slide[[id number]]                                       Final result               CBC Auto Differential[[id number]]        Abnormal            Final result                 Please view results for these tests on the individual orders.    Scan Slide [[id number]] Collected: 04/29/21 2100    Specimen: Blood Updated: 04/29/21 2149      Hypochromia Slight/1+     WBC Morphology Normal     Platelet Estimate Decreased    Comprehensive Metabolic Panel [[id number]]  (Abnormal) Collected: 04/29/21 2100    Specimen: Blood Updated: 04/29/21 2126     Glucose 81 mg/dL      BUN 5 mg/dL      Creatinine 0.46 mg/dL      Sodium 137 mmol/L      Potassium 3.7 mmol/L      Chloride 107 mmol/L      CO2 20.0 mmol/L      Calcium 9.0 mg/dL      Total Protein 6.3 g/dL      Albumin 3.90 g/dL      ALT (SGPT) 19 U/L      AST (SGOT) 17 U/L      Alkaline Phosphatase 105 U/L      Total Bilirubin 0.2 mg/dL      eGFR Non African Amer >150 mL/min/1.73      Globulin 2.4 gm/dL      A/G Ratio 1.6 g/dL      BUN/Creatinine Ratio 10.9     Anion Gap 10.0 mmol/L     Narrative:      GFR Normal >60  Chronic Kidney Disease <60  Kidney Failure <15      aPTT [[id number]]  (Normal) Collected: 04/29/21 2100    Specimen: Blood Updated: 04/29/21 2126     PTT 24.5 seconds     Narrative:      The recommended Heparin therapeutic range is 68-97 seconds.    Protime-INR [[id number]]  (Normal) Collected: 04/29/21 2100    Specimen: Blood Updated: 04/29/21 2126     Protime 12.8 Seconds      INR 0.93    Narrative:      Therapeutic range for most indications is 2.0-3.0 INR,  or 2.5-3.5 for mechanical heart valves.    Fibrinogen [[id number]]  (Normal) Collected: 04/29/21 2100    Specimen: Blood Updated: 04/29/21 2126     Fibrinogen 433 mg/dL     CBC Auto Differential [[id number]]  (Abnormal) Collected: 04/29/21 2100    Specimen: Blood Updated: 04/29/21 2113     WBC 17.73 10*3/mm3      RBC 3.80 10*6/mm3      Hemoglobin 11.7 g/dL      Hematocrit 33.6 %      MCV 88.4 fL      MCH 30.8 pg      MCHC 34.8 g/dL      RDW 13.3 %      RDW-SD 42.5 fl      MPV 12.0 fL      Platelets 135 10*3/mm3      Neutrophil % 76.1 %      Lymphocyte % 14.8 %      Monocyte % 7.1 %      Eosinophil % 0.7 %      Basophil % 0.3 %      Immature Grans % 1.0 %      Neutrophils, Absolute 13.48 10*3/mm3      Lymphocytes, Absolute 2.62 10*3/mm3       Monocytes, Absolute 1.26 10*3/mm3      Eosinophils, Absolute 0.13 10*3/mm3      Basophils, Absolute 0.06 10*3/mm3      Immature Grans, Absolute 0.18 10*3/mm3      nRBC 0.0 /100 WBC     Urinalysis, Microscopic Only - Urine, Clean Catch [[id number]]  (Abnormal) Collected: 04/29/21 1956    Specimen: Urine, Clean Catch Updated: 04/29/21 2020     RBC, UA Too Numerous to Count /HPF      WBC, UA 31-50 /HPF      Bacteria, UA 1+ /HPF      Squamous Epithelial Cells, UA 3-5 /HPF      Hyaline Casts, UA None Seen /LPF      Methodology Automated Microscopy    Urinalysis With Microscopic If Indicated (No Culture) - Urine, Clean Catch [[id number]]  (Abnormal) Collected: 04/29/21 1956    Specimen: Urine, Clean Catch Updated: 04/29/21 2019     Color, UA Red     Appearance, UA Slightly Cloudy     pH, UA 6.0     Specific Gravity, UA 1.015     Glucose, UA Negative     Ketones, UA Negative     Bilirubin, UA Negative     Blood, UA Large (3+)     Protein, UA 30 mg/dL (1+)     Leuk Esterase, UA Moderate (2+)     Nitrite, UA Negative     Urobilinogen, UA 2.0 E.U./dL         Condition at Discharge: Stable   Discharge Diet: Diet Instructions     Diet: Regular      Discharge Diet: Regular         Discharge Activity: Activity Instructions     Lifting Restrictions      Type of Restriction: Lifting    Lifting Restrictions: Other    Explain Lifing Restrictions: No lifting more than infant and baby carrier together for 6 weeks.    Pelvic Rest      Nothing in the vagina for 6 weeks to include tampons or intercourse.         Discharge Medications:    Your medication list      START taking these medications      Instructions Last Dose Given Next Dose Due   cephalexin 500 MG capsule  Commonly known as: KEFLEX      Take 1 capsule by mouth Every 6 (Six) Hours for 10 doses. Indications: Urinary Tract Infection          CONTINUE taking these medications      Instructions Last Dose Given Next Dose Due   ferrous sulfate 325 (65 FE) MG tablet      Take 1  tablet by mouth Daily With Breakfast.       prenatal (CLASSIC) vitamin  tablet  Generic drug: prenatal vitamin      Take 1 tablet by mouth Daily.             Where to Get Your Medications      You can get these medications from any pharmacy    Bring a paper prescription for each of these medications  · cephalexin 500 MG capsule        Discharge Disposition: home   Follow-up: Future Appointments   Date Time Provider Department Center   5/3/2021 10:00 AM [facility] FM OB RESIDENCY [facility] OB R MAD MAD            This note has been electronically signed.    Jerry Hargrove DO  May 2, 2021  10:04 CDT      Electronically signed by Jerry Hargrove DO at 05/02/21 1005       Discharge Order (From admission, onward)     Start     Ordered    05/02/21 1002  Discharge patient  Once     Expected Discharge Date: 05/02/21    Discharge Disposition: Home or Self Care    Physician of Record for Attribution - Please select from Treatment Team: SUSANA TAN [[id number]]    Review needed by CMO to determine Physician of Record: No       Question Answer Comment   Physician of Record for Attribution - Please select from Treatment Team SUSANA TAN    Review needed by CMO to determine Physician of Record No        05/02/21 1001

## 2021-05-10 ENCOUNTER — ROUTINE PRENATAL (OUTPATIENT)
Dept: OBSTETRICS AND GYNECOLOGY | Facility: CLINIC | Age: 21
End: 2021-05-10

## 2021-05-10 VITALS — DIASTOLIC BLOOD PRESSURE: 62 MMHG | SYSTOLIC BLOOD PRESSURE: 102 MMHG | WEIGHT: 129 LBS | BODY MASS INDEX: 25.19 KG/M2

## 2021-05-10 DIAGNOSIS — Z3A.33 33 WEEKS GESTATION OF PREGNANCY: ICD-10-CM

## 2021-05-10 DIAGNOSIS — O43.103 PLACENTAL ABNORMALITY IN THIRD TRIMESTER: Primary | ICD-10-CM

## 2021-05-10 DIAGNOSIS — O99.331 MATERNAL TOBACCO USE IN FIRST TRIMESTER: ICD-10-CM

## 2021-05-10 DIAGNOSIS — O21.9 NAUSEA AND VOMITING DURING PREGNANCY PRIOR TO 22 WEEKS GESTATION: ICD-10-CM

## 2021-05-10 DIAGNOSIS — Z34.03 ENCOUNTER FOR SUPERVISION OF NORMAL FIRST PREGNANCY IN THIRD TRIMESTER: ICD-10-CM

## 2021-05-10 PROCEDURE — 99213 OFFICE O/P EST LOW 20 MIN: CPT | Performed by: OBSTETRICS & GYNECOLOGY

## 2021-05-10 NOTE — PROGRESS NOTES
CC: Prenatal visit    Becky Shaffer is a 20 y.o.  at 33w3d.  Doing well.  Denies contractions, LOF, or VB.  Reports good FM.    /62   Wt 58.5 kg (129 lb)   LMP 2020 (Approximate)   BMI 25.19 kg/m²   SVE: Not done  Fundal Height (cm): 34 cm  Fetal Heart Rate: 138     Problems (from 20 to present)     Problem Noted Resolved    Encounter for supervision of normal first pregnancy in third trimester 2020 by Crystal Wheatley MD No    Overview Addendum 3/22/2021 12:16 PM by Crystal Wheatley MD     A POS/ Rubella equivocal/ GBS @ 36wks  DatinTUS 20 12w5d  Genetics: pending  Tdap: 3/22/21  Anatomy: male, anterior placenta, wnl  1h Glucola: 95  Lab Results   Component Value Date    HGB 12.6 2020    HCT 35.4 2020     2020     Feeding Method: undecided  BC plan: ?           Previous Version    Maternal tobacco use in first trimester 2020 by Guerline Stacy APRN No    Nausea and vomiting during pregnancy prior to 22 weeks gestation 2020 by Guerline Stacy APRN No          A/P: Becky Shaffer is a 20 y.o.  at 33w3d.  - RTC in 1 weeks  - Reviewed COVID-19 visitation policy  - Reviewed COVID-19 precautions     Diagnosis Plan   1. Placental abnormality in third trimester  US Fetal Biophysical Profile;Without Non-Stress Testing    MFM OB US MAD    US Fetal Biophysical Profile;Without Non-Stress Testing biophysical profile .  Repeat 1 week    Patient at hospital with bleeding and contractions.  Presumptive marginal abruption.  Discussed with Dr. Hargrove who hospitalized weekly fetal wellbeing testing   2. Encounter for supervision of normal first pregnancy in third trimester     3. Nausea and vomiting during pregnancy prior to 22 weeks gestation     4. Maternal tobacco use in first trimester     5. 33 weeks gestation of pregnancy       Bhargav Bond MD  5/10/2021  18:18 CDT

## 2021-05-17 ENCOUNTER — ROUTINE PRENATAL (OUTPATIENT)
Dept: OBSTETRICS AND GYNECOLOGY | Facility: CLINIC | Age: 21
End: 2021-05-17

## 2021-05-17 VITALS — WEIGHT: 133.6 LBS | DIASTOLIC BLOOD PRESSURE: 64 MMHG | SYSTOLIC BLOOD PRESSURE: 102 MMHG | BODY MASS INDEX: 26.09 KG/M2

## 2021-05-17 DIAGNOSIS — Z34.03 ENCOUNTER FOR SUPERVISION OF NORMAL FIRST PREGNANCY IN THIRD TRIMESTER: ICD-10-CM

## 2021-05-17 DIAGNOSIS — O43.103 PLACENTAL ABNORMALITY IN THIRD TRIMESTER: ICD-10-CM

## 2021-05-17 DIAGNOSIS — O99.331 MATERNAL TOBACCO USE IN FIRST TRIMESTER: ICD-10-CM

## 2021-05-17 DIAGNOSIS — Z3A.34 34 WEEKS GESTATION OF PREGNANCY: Primary | ICD-10-CM

## 2021-05-17 DIAGNOSIS — O21.9 NAUSEA AND VOMITING DURING PREGNANCY PRIOR TO 22 WEEKS GESTATION: ICD-10-CM

## 2021-05-17 PROBLEM — Z3A.33 33 WEEKS GESTATION OF PREGNANCY: Status: RESOLVED | Noted: 2021-05-10 | Resolved: 2021-05-17

## 2021-05-17 PROCEDURE — 99213 OFFICE O/P EST LOW 20 MIN: CPT | Performed by: FAMILY MEDICINE

## 2021-05-17 NOTE — PROGRESS NOTES
CC: Prenatal visit    Becky Shaffer is a 20 y.o.  at 34w3d.  Doing well.  No complaints.  Denies contractions, LOF, or VB.  Reports good FM.    BPP: , cephalic, anterior placenta, FHR 138bpm    /64   Wt 60.6 kg (133 lb 9.6 oz)   LMP 2020 (Approximate)   BMI 26.09 kg/m²   SVE: deferred  Fundal Height (cm): 34 cm  Fetal Heart Rate: 138 US     Problems (from 20 to present)     Problem Noted Resolved    Encounter for supervision of normal first pregnancy in third trimester 2020 by Crystal Wheatley MD No    Overview Addendum 3/22/2021 12:16 PM by Crystal Wheatley MD     A POS/ Rubella equivocal/ GBS @ 36wks  DatinTUS 20 12w5d  Genetics: pending  Tdap: 3/22/21  Anatomy: male, anterior placenta, wnl  1h Glucola: 95  Lab Results   Component Value Date    HGB 12.6 2020    HCT 35.4 2020     2020     Feeding Method: undecided  BC plan: ?           Previous Version    Maternal tobacco use in first trimester 2020 by Guerline Stacy APRN No    Nausea and vomiting during pregnancy prior to 22 weeks gestation 2020 by Guerline Stacy APRN No          A/P: Becky Shaffer is a 20 y.o.  at 34w3d.  - RTC in 1 week with BPP as recommended by Dr. Bond for questionable placental issue  - [facility] reviewed.  PTL precautions given.  Encouraged to drink 3-4 glasses of ice water if she experiences contractions.  If contractions occur regularly (every 5-10 minutes or less) for 1 hour and do not resolve with drinking fluids and/or taking a warm bath, patient advised to come to L&D for evaluation.       Diagnosis Plan   1. 34 weeks gestation of pregnancy     2. Encounter for supervision of normal first pregnancy in third trimester     3. Nausea and vomiting during pregnancy prior to 22 weeks gestation     4. Maternal tobacco use in first trimester     5. Placental abnormality in third trimester  MFM OB US  MAD       Signature  Crystal Wheatley MD  [facility]'s [address], [place] [address]  Office: [phone]      This document has been electronically signed by Crystal Wheatley MD on May 17, 2021 14:40 CDT

## 2021-05-27 ENCOUNTER — ROUTINE PRENATAL (OUTPATIENT)
Dept: OBSTETRICS AND GYNECOLOGY | Facility: CLINIC | Age: 21
End: 2021-05-27

## 2021-05-27 VITALS — SYSTOLIC BLOOD PRESSURE: 100 MMHG | DIASTOLIC BLOOD PRESSURE: 60 MMHG

## 2021-05-27 DIAGNOSIS — Z3A.35 35 WEEKS GESTATION OF PREGNANCY: ICD-10-CM

## 2021-05-27 DIAGNOSIS — O99.331 MATERNAL TOBACCO USE IN FIRST TRIMESTER: Primary | ICD-10-CM

## 2021-05-27 DIAGNOSIS — O43.103 PLACENTAL ABNORMALITY IN THIRD TRIMESTER: ICD-10-CM

## 2021-05-27 DIAGNOSIS — O46.93 VAGINAL BLEEDING IN PREGNANCY, THIRD TRIMESTER: ICD-10-CM

## 2021-05-27 DIAGNOSIS — Z34.03 ENCOUNTER FOR SUPERVISION OF NORMAL FIRST PREGNANCY IN THIRD TRIMESTER: ICD-10-CM

## 2021-05-27 PROCEDURE — 99213 OFFICE O/P EST LOW 20 MIN: CPT | Performed by: OBSTETRICS & GYNECOLOGY

## 2021-05-27 PROCEDURE — 87653 STREP B DNA AMP PROBE: CPT | Performed by: OBSTETRICS & GYNECOLOGY

## 2021-05-28 LAB — GROUP B STREP, DNA: NEGATIVE

## 2021-05-29 NOTE — PROGRESS NOTES
CC: Prenatal visit    Becky Shaffer is a 20 y.o.  at 36w0d.  Doing well.  Denies contractions, LOF, or VB.  Reports good FM.  Patient had ultrasound today I reviewed the preliminary report myself.  8 out of 8 BPP.  Patient has been watched closely for placental issues did have episode of  contractions and bleeding approximately 3 to 4 weeks ago since that time has not had any further bleeding and no more contractions remained stable at this time.    /60   LMP 2020 (Approximate)     Fundal Height (cm): 35 cm  Fetal Heart Rate: 138     Problems (from 20 to present)     Problem Noted Resolved    Encounter for supervision of normal first pregnancy in third trimester 2020 by Crystal Wheatley MD No    Overview Addendum 3/22/2021 12:16 PM by Crystal Wheatley MD     A POS/ Rubella equivocal/ GBS @ 36wks  DatinTUS 20 12w5d  Genetics: pending  Tdap: 3/22/21  Anatomy: male, anterior placenta, wnl  1h Glucola: 95  Lab Results   Component Value Date    HGB 12.6 2020    HCT 35.4 2020     2020     Feeding Method: undecided  BC plan: ?           Previous Version    Maternal tobacco use in first trimester 2020 by Guerline Stacy, LYNDA No    Nausea and vomiting during pregnancy prior to 22 weeks gestation 2020 by Guerline Stacy APRN No          A/P: Becky Shaffer is a 20 y.o.  at 36w0d.  Supervision of high-risk pregnancy secondary to placental issues and maternal smoking.  Patient and baby overall stable with reassuring 8 out of 8 BPP today.  Fetal kick count and labor precautions given.  Patient to continue with weekly BPP's.  Patient does not want to deliver early if she can avoid it.  If BPP's remained stable we will continue to watch would not recommend going further than 39 weeks.  Perhaps sooner if any decompensation occurs.  - RTC in 1 weeks  - Reviewed COVID-19 visitation  policy  - Reviewed COVID-19 precautions     Diagnosis Plan   1. Maternal tobacco use in first trimester     2. Encounter for supervision of normal first pregnancy in third trimester     3. Placental abnormality in third trimester  [facility] OB US RUDY   4. 35 weeks gestation of pregnancy  Group B Strep (Molecular) - Swab, Vaginal/Rectum   5. Vaginal bleeding in pregnancy, third trimester  [facility] OB  RUDY Hargrove,   5/28/2021  23:43 CDT

## 2021-06-18 ENCOUNTER — ROUTINE PRENATAL (OUTPATIENT)
Dept: OBSTETRICS AND GYNECOLOGY | Facility: CLINIC | Age: 21
End: 2021-06-18

## 2021-06-18 VITALS — BODY MASS INDEX: 28.01 KG/M2 | DIASTOLIC BLOOD PRESSURE: 78 MMHG | WEIGHT: 143.4 LBS | SYSTOLIC BLOOD PRESSURE: 112 MMHG

## 2021-06-18 DIAGNOSIS — O99.331 MATERNAL TOBACCO USE IN FIRST TRIMESTER: ICD-10-CM

## 2021-06-18 DIAGNOSIS — Z3A.39 39 WEEKS GESTATION OF PREGNANCY: ICD-10-CM

## 2021-06-18 DIAGNOSIS — O21.9 NAUSEA AND VOMITING DURING PREGNANCY PRIOR TO 22 WEEKS GESTATION: ICD-10-CM

## 2021-06-18 DIAGNOSIS — Z34.03 ENCOUNTER FOR SUPERVISION OF NORMAL FIRST PREGNANCY IN THIRD TRIMESTER: ICD-10-CM

## 2021-06-18 DIAGNOSIS — O45.93 PLACENTAL ABRUPTION, THIRD TRIMESTER: Primary | ICD-10-CM

## 2021-06-18 DIAGNOSIS — O09.30 HISTORY OF INADEQUATE PRENATAL CARE: ICD-10-CM

## 2021-06-18 PROCEDURE — 99213 OFFICE O/P EST LOW 20 MIN: CPT | Performed by: OBSTETRICS & GYNECOLOGY

## 2021-06-18 RX ORDER — MISOPROSTOL 100 UG/1
25 TABLET ORAL ONCE
Status: CANCELLED | OUTPATIENT
Start: 2021-06-18 | End: 2021-06-18

## 2021-06-18 RX ORDER — BUTORPHANOL TARTRATE 1 MG/ML
2 INJECTION, SOLUTION INTRAMUSCULAR; INTRAVENOUS
Status: CANCELLED | OUTPATIENT
Start: 2021-06-18

## 2021-06-18 RX ORDER — PROMETHAZINE HYDROCHLORIDE 25 MG/1
12.5 SUPPOSITORY RECTAL EVERY 6 HOURS PRN
Status: CANCELLED | OUTPATIENT
Start: 2021-06-18

## 2021-06-18 RX ORDER — OXYTOCIN 10 [USP'U]/ML
650 INJECTION, SOLUTION INTRAMUSCULAR; INTRAVENOUS ONCE
Status: CANCELLED | OUTPATIENT
Start: 2021-06-18

## 2021-06-18 RX ORDER — MISOPROSTOL 100 UG/1
800 TABLET ORAL AS NEEDED
Status: CANCELLED | OUTPATIENT
Start: 2021-06-18

## 2021-06-18 RX ORDER — DEXTROSE, SODIUM CHLORIDE, SODIUM LACTATE, POTASSIUM CHLORIDE, AND CALCIUM CHLORIDE 5; .6; .31; .03; .02 G/100ML; G/100ML; G/100ML; G/100ML; G/100ML
125 INJECTION, SOLUTION INTRAVENOUS CONTINUOUS
Status: CANCELLED | OUTPATIENT
Start: 2021-06-18

## 2021-06-18 RX ORDER — OXYTOCIN 10 [USP'U]/ML
2-20 INJECTION, SOLUTION INTRAMUSCULAR; INTRAVENOUS
Status: CANCELLED | OUTPATIENT
Start: 2021-06-18

## 2021-06-18 RX ORDER — SODIUM CHLORIDE 0.9 % (FLUSH) 0.9 %
3 SYRINGE (ML) INJECTION EVERY 12 HOURS SCHEDULED
Status: CANCELLED | OUTPATIENT
Start: 2021-06-18

## 2021-06-18 RX ORDER — METHYLERGONOVINE MALEATE 0.2 MG/ML
200 INJECTION INTRAVENOUS ONCE AS NEEDED
Status: CANCELLED | OUTPATIENT
Start: 2021-06-18

## 2021-06-18 RX ORDER — BUTORPHANOL TARTRATE 1 MG/ML
1 INJECTION, SOLUTION INTRAMUSCULAR; INTRAVENOUS
Status: CANCELLED | OUTPATIENT
Start: 2021-06-18

## 2021-06-18 RX ORDER — PROMETHAZINE HYDROCHLORIDE 25 MG/1
12.5 TABLET ORAL EVERY 6 HOURS PRN
Status: CANCELLED | OUTPATIENT
Start: 2021-06-18

## 2021-06-18 RX ORDER — OXYTOCIN 10 [USP'U]/ML
85 INJECTION, SOLUTION INTRAMUSCULAR; INTRAVENOUS ONCE
Status: CANCELLED | OUTPATIENT
Start: 2021-06-18

## 2021-06-18 RX ORDER — LIDOCAINE HYDROCHLORIDE 10 MG/ML
5 INJECTION, SOLUTION EPIDURAL; INFILTRATION; INTRACAUDAL; PERINEURAL AS NEEDED
Status: CANCELLED | OUTPATIENT
Start: 2021-06-18

## 2021-06-18 RX ORDER — SODIUM CHLORIDE 0.9 % (FLUSH) 0.9 %
3-10 SYRINGE (ML) INJECTION AS NEEDED
Status: CANCELLED | OUTPATIENT
Start: 2021-06-18

## 2021-06-18 RX ORDER — CARBOPROST TROMETHAMINE 250 UG/ML
250 INJECTION, SOLUTION INTRAMUSCULAR AS NEEDED
Status: CANCELLED | OUTPATIENT
Start: 2021-06-18

## 2021-06-18 NOTE — H&P (VIEW-ONLY)
Obstetric History and Physical    Chief complaint possible partial placental abruption.        Patient is a 20 y.o. female  currently at 39w0d, who presents with concern for partial placental abruption.  Patient had had bleeding and contractions at about 34 weeks the bleeding with resolved.  Concern for partial placental abruption.  Placental abruption had not been demonstrated ultrasonographically but it is known that the sensitivity placental abruption ultrasonographically is not 100%.  She had not followed with biophysical profiles but had not for the last couple weeks that she said she had had a death in the family and had not followed up despite the importance of follow-up pending been emphasized to her.    Her prenatal care is has been followed up through [facility] with in adequate prenatal care     Obstetric History   # 1 - Date: None, Sex: None, Weight: None, GA: None, Delivery: None, Apgar1: None, Apgar5: None, Living: None, Birth Comments: None       The following portions of the patients history were reviewed and updated as appropriate: current medications, allergies, past medical history, past surgical history, past family history, past social history and problem list .       Prenatal Information:  Prenatal Results     POC Urine Glucose/Protein     Test Value Reference Range Date Time    Urine Glucose        Urine Protein              Initial Prenatal Labs     Test Value Reference Range Date Time    Hemoglobin  12.6 g/dL 12.0 - 15.9 20 1347    Hematocrit  35.4 % 34.0 - 46.6 20 1347    Platelets  131 10*3/mm3 140 - 450 21 0636       144 10*3/mm3 140 - 450 21 0614       144 10*3/mm3 140 - 450 21 0322       135 10*3/mm3 140 - 450 21 2100       139 10*3/mm3 140 - 450 21 1332       142 10*3/mm3 140 - 450 21 0958       225 10*3/mm3 140 - 450 20 1347    Rubella IgG  Equivocal   20 1347    Hepatitis B SAg  Non-Reactive   Non-Reactive 11/16/20 1347    Hepatitis C Ab  Non-Reactive  Non-Reactive 11/16/20 1347    RPR  Non-Reactive  Non-Reactive 11/16/20 1347    ABO  A   11/16/20 1347    Rh  Positive   11/16/20 1347    Antibody Screen  Negative   11/16/20 1347    HIV  Non-Reactive  Non-Reactive 11/16/20 1347    Urine Culture  No growth   11/16/20 1347    Gonorrhea  Negative  Negative 11/16/20 1347    Chlamydia  Negative  Negative 11/16/20 1347    TSH              2nd and 3rd Trimester     Test Value Reference Range Date Time    Hemoglobin (repeated)  11.1 g/dL 12.0 - 15.9 05/02/21 0636       11.6 g/dL 12.0 - 15.9 05/01/21 0614       12.0 g/dL 12.0 - 15.9 04/30/21 0322       11.7 g/dL 12.0 - 15.9 04/29/21 2100       11.8 g/dL 12.0 - 15.9 04/17/21 1332       12.2 g/dL 12.0 - 15.9 03/22/21 0958    Hematocrit (repeated)  31.9 % 34.0 - 46.6 05/02/21 0636       33.0 % 34.0 - 46.6 05/01/21 0614       33.5 % 34.0 - 46.6 04/30/21 0322       33.6 % 34.0 - 46.6 04/29/21 2100       34.2 % 34.0 - 46.6 04/17/21 1332       34.7 % 34.0 - 46.6 03/22/21 0958    GCT  95 mg/dL 60 - 140 03/22/21 0958    Antibody Screen (repeated)        GTT Fasting        GTT 1 Hr        GTT 2 Hr        GTT 3 Hr        Group B Strep  Negative  Negative 05/27/21 1403          Drug Screening     Test Value Reference Range Date Time    Amphetamine Screen  Negative  Negative 04/17/21 1324       Negative  Negative 11/16/20 1347    Barbiturate Screen  Negative  Negative 04/17/21 1324       Negative  Negative 11/16/20 1347    Benzodiazepine Screen  Negative  Negative 04/17/21 1324       Negative  Negative 11/16/20 1347    Methadone Screen  Negative  Negative 04/17/21 1324       Negative  Negative 11/16/20 1347    Phencyclidine Screen  Negative  Negative 04/17/21 1324       Negative  Negative 11/16/20 1347    Opiates Screen  Negative  Negative 04/17/21 1324       Negative  Negative 11/16/20 1347    THC Screen  Negative  Negative 04/17/21 1324       Negative  Negative 11/16/20 1347       Cocaine Screen  Negative  Negative 04/17/21 1324       Negative  Negative 11/16/20 1347    Propoxyphene Screen  Negative  Negative 04/17/21 1324       Negative  Negative 11/16/20 1347    Buprenorphine Screen  Negative  Negative 04/17/21 1324       Negative  Negative 11/16/20 1347    Methamphetamine Screen  Negative  Negative 04/17/21 1324       Negative  Negative 11/16/20 1347    Oxycodone Screen  Negative  Negative 04/17/21 1324       Negative  Negative 11/16/20 1347    Tricyclic Antidepressants Screen  Negative  Negative 04/17/21 1324       Negative  Negative 11/16/20 1347          Other (Risk screening)     Test Value Reference Range Date Time    Varicella IgG  Negative  Positive, Equivocal 11/16/20 1347    Parvovirus IgG        CMV IgG        Cystic Fibrosis        Hemoglobin electrophoresis        NIPT        MSAFP-4        AFP (for NTD only)              Legend    ^: Historical                      External Prenatal Results     Pregnancy Outside Results - Transcribed From Office Records - See Scanned Records For Details     Test Value Date Time    ABO  A  11/16/20 1347    Rh  Positive  11/16/20 1347    Antibody Screen  Negative  11/16/20 1347    Varicella IgG  Negative  11/16/20 1347    Rubella  Equivocal  11/16/20 1347    Hgb  11.1 g/dL 05/02/21 0636       11.6 g/dL 05/01/21 0614       12.0 g/dL 04/30/21 0322       11.7 g/dL 04/29/21 2100       11.8 g/dL 04/17/21 1332       12.2 g/dL 03/22/21 0958       12.6 g/dL 11/16/20 1347    Hct  31.9 % 05/02/21 0636       33.0 % 05/01/21 0614       33.5 % 04/30/21 0322       33.6 % 04/29/21 2100       34.2 % 04/17/21 1332       34.7 % 03/22/21 0958       35.4 % 11/16/20 1347    Glucose Fasting GTT       Glucose Tolerance Test 1 hour       Glucose Tolerance Test 3 hour       Gonorrhea (discrete)  Negative  11/16/20 1347    Chlamydia (discrete)  Negative  11/16/20 1347    RPR  Non-Reactive  11/16/20 1347    VDRL       Syphilis Antibody       HBsAg  Non-Reactive   20 1347    Herpes Simplex Virus PCR       Herpes Simplex VIrus Culture       HIV  Non-Reactive  20 1347    Hep C RNA Quant PCR       Hep C Antibody  Non-Reactive  20 1347    AFP       Group B Strep  Negative  21 1403    GBS Susceptibility to Clindamycin       GBS Susceptibility to Erythromycin       Fetal Fibronectin       Genetic Testing, Maternal Blood             Drug Screening     Test Value Date Time    Urine Drug Screen       Amphetamine Screen  Negative  21 1324       Negative  20 1347    Barbiturate Screen  Negative  21 1324       Negative  20 1347    Benzodiazepine Screen  Negative  21 1324       Negative  20 1347    Methadone Screen  Negative  21 1324       Negative  20 1347    Phencyclidine Screen  Negative  21 1324       Negative  20 1347    Opiates Screen  Negative  21 1324       Negative  20 1347    THC Screen  Negative  21 1324       Negative  20 1347    Cocaine Screen       Propoxyphene Screen  Negative  21 1324       Negative  20 1347    Buprenorphine Screen  Negative  21 1324       Negative  20 1347    Methamphetamine Screen       Oxycodone Screen  Negative  21 1324       Negative  20 1347    Tricyclic Antidepressants Screen  Negative  21 1324       Negative  20 1347          Legend    ^: Historical                         Past OB History:     OB History    Para Term  AB Living   1 0 0 0 0 0   SAB TAB Ectopic Molar Multiple Live Births   0 0 0 0 0 0      # Outcome Date GA Lbr Gera/2nd Weight Sex Delivery Anes PTL Lv   1 Current                 ALLERGIES:   No Known Allergies     Home Medications:     Prior to Admission medications    Medication Sig Start Date End Date Taking? Authorizing Provider   ferrous sulfate 325 (65 FE) MG tablet Take 1 tablet by mouth Daily With Breakfast. 21  Yes Bhargav Bond MD   prenatal vitamin  (prenatal, CLASSIC, vitamin) tablet Take 1 tablet by mouth Daily.   Yes Provider, MD Amanuel       Past Medical History: Past Medical History:   Diagnosis Date   • Anxiety    • Migraine    • Smoker       Past Surgical History Past Surgical History:   Procedure Laterality Date   • WISDOM TOOTH EXTRACTION        Family History: Family History   Problem Relation Age of Onset   • No Known Problems Sister    • No Known Problems Brother    • Ovarian cancer Paternal Grandmother    • No Known Problems Sister    • No Known Problems Brother    • No Known Problems Brother       Social History:  reports that she has been smoking cigarettes. She has never used smokeless tobacco.   reports previous alcohol use.   reports previous drug use.        Review of Systems                                                                                                                      Constitutional: Denies night sweats    HENT: No hearing changes, denies ear pain    Eye: No eye pain; no foreign body in eye    Pulmonary: No hemoptysis    Cardiovascular: No claudication    GI: No hematemesis    Musculoskeletal: No arthralgias, no joint swelling    Endocrine: No polydipsia or polyuria    Hematologic: Denies any free bleeding    Psychiatric: Denies any delusions      Objective     Documented Vitals    06/18/21 1138   BP: 112/78   Weight: 65 kg (143 lb 6.4 oz)          OBGyn Exam  Constitutional: Appears to be in no acute distress; Eyes: sclera normal; Endocrine system: thyroid palpate is normal; Pulmonary system: lungs clear; Cardiovascular system: heart regular rate and rhythm; Gastrointestinal system: abdomen soft nontender, active bowel sounds; Urologic system: CVA negative; Psychiatric: appropriate insight; Neurologic: gait within normal limits cervix is fingertip to 1      Last Labs  Lab Results   Component Value Date    WBC 20.75 (H) 05/02/2021    RBC 3.49 (L) 05/02/2021    HGB 11.1 (L) 05/02/2021    HCT 31.9 (L) 05/02/2021     MCV 91.4 2021    MCH 31.8 2021    MCHC 34.8 2021    RDW 13.6 2021    RDWSD 45.1 2021    MPV 12.2 (H) 2021     (L) 2021        Lab Results   Component Value Date    GLUCOSE 81 2021    BUN 5 (L) 2021    CREATININE 0.46 (L) 2021     2021    K 3.7 2021     2021    CO2 20.0 (L) 2021    CALCIUM 9.0 2021    PROTEINTOT 6.3 2021    ALBUMIN 3.90 2021    ALT 19 2021    AST 17 2021    ALKPHOS 105 2021    BILITOT 0.2 2021    EGFRIFNONA >150 2021    GLOB 2.4 2021    AGRATIO 1.6 2021    BCR 10.9 2021    ANIONGAP 10.0 2021       No results found for: HCGQUAL      Assessment/Plan:  1. 20 y.o. [id number].  Possible partial placental abruption chronic after reviewing risk benefits alternatives as she is now 39 weeks we will plan for induction of labor likely Cytotec followed by cervical balloon in induction with Pitocin risk benefits alternatives reviewed including risk of hypertonic contractions damage mother baby.  Risk of iatrogenic  section    Becky Shaffer and I have discussed pain goals for this hospitalization after reviewing her current clinical condition, medical history and prior pain experiences.  The goal is to keep her pain level 3.  To help achieve this, I plan to multimodal pain management.       This document has been electronically signed by Bhargav Bond MD on 2021 13:17 CDT\    Please note that portions of this note were completed with a voice recognition program.

## 2021-06-18 NOTE — PROGRESS NOTES
CC: Prenatal visit    Becky Shaffer is a 20 y.o.  at 39w0d.  Doing well.  Denies contractions, LOF, or VB.  Reports good FM.    /78   Wt 65 kg (143 lb 6.4 oz)   LMP 2020 (Approximate)   BMI 28.01 kg/m²   SVE: Fingertip to 1  Fundal Height (cm): 39 cm  Fetal Heart Rate: 138     Problems (from 20 to present)     Problem Noted Resolved    Encounter for supervision of normal first pregnancy in third trimester 2020 by Crystal Wheatley MD No    Overview Addendum 3/22/2021 12:16 PM by Crystal Wheatley MD     A POS/ Rubella equivocal/ GBS @ 36wks  DatinTUS 20 12w5d  Genetics: pending  Tdap: 3/22/21  Anatomy: male, anterior placenta, wnl  1h Glucola: 95  Lab Results   Component Value Date    HGB 12.6 2020    HCT 35.4 2020     2020     Feeding Method: undecided  BC plan: ?           Previous Version    Maternal tobacco use in first trimester 2020 by Guerline Stacy APRN No    Nausea and vomiting during pregnancy prior to 22 weeks gestation 2020 by Guerline Stacy APRN No          A/P: Becky Shaffer is a 20 y.o.  at 39w0d.    - Reviewed COVID-19 visitation policy  - Reviewed COVID-19 precautions     Diagnosis Plan   1. Placental abruption, third trimester  lidocaine PF 1% (XYLOCAINE) injection 5 mL    sodium chloride 0.9 % flush 3 mL    sodium chloride 0.9 % flush 3-10 mL    lactated ringers bolus 1,000 mL    dextrose 5 % and lactated Ringer's infusion    butorphanol (STADOL) injection 1 mg    butorphanol (STADOL) injection 2 mg    promethazine (PHENERGAN) suppository 12.5 mg    promethazine (PHENERGAN) tablet 12.5 mg    miSOPROStol (CYTOTEC) tablet 25 mcg    oxytocin (PITOCIN) injection    oxytocin (PITOCIN) injection    oxytocin (PITOCIN) injection    methylergonovine (METHERGINE) injection 200 mcg    carboprost (HEMABATE) injection 250 mcg    miSOPROStol (CYTOTEC) tablet 800 mcg  after reviewing risk benefits alternatives and as per plan discussed by Dr. Hargrove with placement now that she is 39 weeks we will plan for delivery had not shown up for the last 2 weeks despite understanding importance of regular prenatal care given high risk nature   2. Encounter for supervision of normal first pregnancy in third trimester     3. Nausea and vomiting during pregnancy prior to 22 weeks gestation     4. Maternal tobacco use in first trimester     5. 39 weeks gestation of pregnancy     6. History of inadequate prenatal care  not seen for last 2 weeks     Bhargav Bond MD  6/18/2021  13:22 CDT

## 2021-06-18 NOTE — H&P
Obstetric History and Physical    Chief complaint possible partial placental abruption.        Patient is a 20 y.o. female  currently at 39w0d, who presents with concern for partial placental abruption.  Patient had had bleeding and contractions at about 34 weeks the bleeding with resolved.  Concern for partial placental abruption.  Placental abruption had not been demonstrated ultrasonographically but it is known that the sensitivity placental abruption ultrasonographically is not 100%.  She had not followed with biophysical profiles but had not for the last couple weeks that she said she had had a death in the family and had not followed up despite the importance of follow-up pending been emphasized to her.    Her prenatal care is has been followed up through [facility] with in adequate prenatal care     Obstetric History   # 1 - Date: None, Sex: None, Weight: None, GA: None, Delivery: None, Apgar1: None, Apgar5: None, Living: None, Birth Comments: None       The following portions of the patients history were reviewed and updated as appropriate: current medications, allergies, past medical history, past surgical history, past family history, past social history and problem list .       Prenatal Information:  Prenatal Results     POC Urine Glucose/Protein     Test Value Reference Range Date Time    Urine Glucose        Urine Protein              Initial Prenatal Labs     Test Value Reference Range Date Time    Hemoglobin  12.6 g/dL 12.0 - 15.9 20 1347    Hematocrit  35.4 % 34.0 - 46.6 20 1347    Platelets  131 10*3/mm3 140 - 450 21 0636       144 10*3/mm3 140 - 450 21 0614       144 10*3/mm3 140 - 450 21 0322       135 10*3/mm3 140 - 450 21 2100       139 10*3/mm3 140 - 450 21 1332       142 10*3/mm3 140 - 450 21 0958       225 10*3/mm3 140 - 450 20 1347    Rubella IgG  Equivocal   20 1347    Hepatitis B SAg  Non-Reactive   Non-Reactive 11/16/20 1347    Hepatitis C Ab  Non-Reactive  Non-Reactive 11/16/20 1347    RPR  Non-Reactive  Non-Reactive 11/16/20 1347    ABO  A   11/16/20 1347    Rh  Positive   11/16/20 1347    Antibody Screen  Negative   11/16/20 1347    HIV  Non-Reactive  Non-Reactive 11/16/20 1347    Urine Culture  No growth   11/16/20 1347    Gonorrhea  Negative  Negative 11/16/20 1347    Chlamydia  Negative  Negative 11/16/20 1347    TSH              2nd and 3rd Trimester     Test Value Reference Range Date Time    Hemoglobin (repeated)  11.1 g/dL 12.0 - 15.9 05/02/21 0636       11.6 g/dL 12.0 - 15.9 05/01/21 0614       12.0 g/dL 12.0 - 15.9 04/30/21 0322       11.7 g/dL 12.0 - 15.9 04/29/21 2100       11.8 g/dL 12.0 - 15.9 04/17/21 1332       12.2 g/dL 12.0 - 15.9 03/22/21 0958    Hematocrit (repeated)  31.9 % 34.0 - 46.6 05/02/21 0636       33.0 % 34.0 - 46.6 05/01/21 0614       33.5 % 34.0 - 46.6 04/30/21 0322       33.6 % 34.0 - 46.6 04/29/21 2100       34.2 % 34.0 - 46.6 04/17/21 1332       34.7 % 34.0 - 46.6 03/22/21 0958    GCT  95 mg/dL 60 - 140 03/22/21 0958    Antibody Screen (repeated)        GTT Fasting        GTT 1 Hr        GTT 2 Hr        GTT 3 Hr        Group B Strep  Negative  Negative 05/27/21 1403          Drug Screening     Test Value Reference Range Date Time    Amphetamine Screen  Negative  Negative 04/17/21 1324       Negative  Negative 11/16/20 1347    Barbiturate Screen  Negative  Negative 04/17/21 1324       Negative  Negative 11/16/20 1347    Benzodiazepine Screen  Negative  Negative 04/17/21 1324       Negative  Negative 11/16/20 1347    Methadone Screen  Negative  Negative 04/17/21 1324       Negative  Negative 11/16/20 1347    Phencyclidine Screen  Negative  Negative 04/17/21 1324       Negative  Negative 11/16/20 1347    Opiates Screen  Negative  Negative 04/17/21 1324       Negative  Negative 11/16/20 1347    THC Screen  Negative  Negative 04/17/21 1324       Negative  Negative 11/16/20 1347     Cocaine Screen  Negative  Negative 04/17/21 1324       Negative  Negative 11/16/20 1347    Propoxyphene Screen  Negative  Negative 04/17/21 1324       Negative  Negative 11/16/20 1347    Buprenorphine Screen  Negative  Negative 04/17/21 1324       Negative  Negative 11/16/20 1347    Methamphetamine Screen  Negative  Negative 04/17/21 1324       Negative  Negative 11/16/20 1347    Oxycodone Screen  Negative  Negative 04/17/21 1324       Negative  Negative 11/16/20 1347    Tricyclic Antidepressants Screen  Negative  Negative 04/17/21 1324       Negative  Negative 11/16/20 1347          Other (Risk screening)     Test Value Reference Range Date Time    Varicella IgG  Negative  Positive, Equivocal 11/16/20 1347    Parvovirus IgG        CMV IgG        Cystic Fibrosis        Hemoglobin electrophoresis        NIPT        MSAFP-4        AFP (for NTD only)              Legend    ^: Historical                      External Prenatal Results     Pregnancy Outside Results - Transcribed From Office Records - See Scanned Records For Details     Test Value Date Time    ABO  A  11/16/20 1347    Rh  Positive  11/16/20 1347    Antibody Screen  Negative  11/16/20 1347    Varicella IgG  Negative  11/16/20 1347    Rubella  Equivocal  11/16/20 1347    Hgb  11.1 g/dL 05/02/21 0636       11.6 g/dL 05/01/21 0614       12.0 g/dL 04/30/21 0322       11.7 g/dL 04/29/21 2100       11.8 g/dL 04/17/21 1332       12.2 g/dL 03/22/21 0958       12.6 g/dL 11/16/20 1347    Hct  31.9 % 05/02/21 0636       33.0 % 05/01/21 0614       33.5 % 04/30/21 0322       33.6 % 04/29/21 2100       34.2 % 04/17/21 1332       34.7 % 03/22/21 0958       35.4 % 11/16/20 1347    Glucose Fasting GTT       Glucose Tolerance Test 1 hour       Glucose Tolerance Test 3 hour       Gonorrhea (discrete)  Negative  11/16/20 1347    Chlamydia (discrete)  Negative  11/16/20 1347    RPR  Non-Reactive  11/16/20 1347    VDRL       Syphilis Antibody       HBsAg  Non-Reactive   20 1347    Herpes Simplex Virus PCR       Herpes Simplex VIrus Culture       HIV  Non-Reactive  20 1347    Hep C RNA Quant PCR       Hep C Antibody  Non-Reactive  20 1347    AFP       Group B Strep  Negative  21 1403    GBS Susceptibility to Clindamycin       GBS Susceptibility to Erythromycin       Fetal Fibronectin       Genetic Testing, Maternal Blood             Drug Screening     Test Value Date Time    Urine Drug Screen       Amphetamine Screen  Negative  21 1324       Negative  20 1347    Barbiturate Screen  Negative  21 1324       Negative  20 1347    Benzodiazepine Screen  Negative  21 1324       Negative  20 1347    Methadone Screen  Negative  21 1324       Negative  20 1347    Phencyclidine Screen  Negative  21 1324       Negative  20 1347    Opiates Screen  Negative  21 1324       Negative  20 1347    THC Screen  Negative  21 1324       Negative  20 1347    Cocaine Screen       Propoxyphene Screen  Negative  21 1324       Negative  20 1347    Buprenorphine Screen  Negative  21 1324       Negative  20 1347    Methamphetamine Screen       Oxycodone Screen  Negative  21 1324       Negative  20 1347    Tricyclic Antidepressants Screen  Negative  21 1324       Negative  20 1347          Legend    ^: Historical                         Past OB History:     OB History    Para Term  AB Living   1 0 0 0 0 0   SAB TAB Ectopic Molar Multiple Live Births   0 0 0 0 0 0      # Outcome Date GA Lbr Gera/2nd Weight Sex Delivery Anes PTL Lv   1 Current                 ALLERGIES:   No Known Allergies     Home Medications:     Prior to Admission medications    Medication Sig Start Date End Date Taking? Authorizing Provider   ferrous sulfate 325 (65 FE) MG tablet Take 1 tablet by mouth Daily With Breakfast. 21  Yes Bhargav Bond MD   prenatal vitamin  (prenatal, CLASSIC, vitamin) tablet Take 1 tablet by mouth Daily.   Yes Provider, MD Amanuel       Past Medical History: Past Medical History:   Diagnosis Date   • Anxiety    • Migraine    • Smoker       Past Surgical History Past Surgical History:   Procedure Laterality Date   • WISDOM TOOTH EXTRACTION        Family History: Family History   Problem Relation Age of Onset   • No Known Problems Sister    • No Known Problems Brother    • Ovarian cancer Paternal Grandmother    • No Known Problems Sister    • No Known Problems Brother    • No Known Problems Brother       Social History:  reports that she has been smoking cigarettes. She has never used smokeless tobacco.   reports previous alcohol use.   reports previous drug use.        Review of Systems                                                                                                                      Constitutional: Denies night sweats    HENT: No hearing changes, denies ear pain    Eye: No eye pain; no foreign body in eye    Pulmonary: No hemoptysis    Cardiovascular: No claudication    GI: No hematemesis    Musculoskeletal: No arthralgias, no joint swelling    Endocrine: No polydipsia or polyuria    Hematologic: Denies any free bleeding    Psychiatric: Denies any delusions      Objective     Documented Vitals    06/18/21 1138   BP: 112/78   Weight: 65 kg (143 lb 6.4 oz)          OBGyn Exam  Constitutional: Appears to be in no acute distress; Eyes: sclera normal; Endocrine system: thyroid palpate is normal; Pulmonary system: lungs clear; Cardiovascular system: heart regular rate and rhythm; Gastrointestinal system: abdomen soft nontender, active bowel sounds; Urologic system: CVA negative; Psychiatric: appropriate insight; Neurologic: gait within normal limits cervix is fingertip to 1      Last Labs  Lab Results   Component Value Date    WBC 20.75 (H) 05/02/2021    RBC 3.49 (L) 05/02/2021    HGB 11.1 (L) 05/02/2021    HCT 31.9 (L) 05/02/2021     MCV 91.4 2021    MCH 31.8 2021    MCHC 34.8 2021    RDW 13.6 2021    RDWSD 45.1 2021    MPV 12.2 (H) 2021     (L) 2021        Lab Results   Component Value Date    GLUCOSE 81 2021    BUN 5 (L) 2021    CREATININE 0.46 (L) 2021     2021    K 3.7 2021     2021    CO2 20.0 (L) 2021    CALCIUM 9.0 2021    PROTEINTOT 6.3 2021    ALBUMIN 3.90 2021    ALT 19 2021    AST 17 2021    ALKPHOS 105 2021    BILITOT 0.2 2021    EGFRIFNONA >150 2021    GLOB 2.4 2021    AGRATIO 1.6 2021    BCR 10.9 2021    ANIONGAP 10.0 2021       No results found for: HCGQUAL      Assessment/Plan:  1. 20 y.o. [id number].  Possible partial placental abruption chronic after reviewing risk benefits alternatives as she is now 39 weeks we will plan for induction of labor likely Cytotec followed by cervical balloon in induction with Pitocin risk benefits alternatives reviewed including risk of hypertonic contractions damage mother baby.  Risk of iatrogenic  section    Bceky Shaffer and I have discussed pain goals for this hospitalization after reviewing her current clinical condition, medical history and prior pain experiences.  The goal is to keep her pain level 3.  To help achieve this, I plan to multimodal pain management.       This document has been electronically signed by Bhargav Bond MD on 2021 13:17 CDT\    Please note that portions of this note were completed with a voice recognition program.

## 2021-06-19 ENCOUNTER — ANESTHESIA EVENT (OUTPATIENT)
Dept: LABOR AND DELIVERY | Facility: HOSPITAL | Age: 21
End: 2021-06-19

## 2021-06-19 ENCOUNTER — HOSPITAL ENCOUNTER (INPATIENT)
Dept: LABOR AND DELIVERY | Facility: HOSPITAL | Age: 21
Discharge: HOME | End: 2021-06-19

## 2021-06-19 ENCOUNTER — ANESTHESIA (OUTPATIENT)
Dept: LABOR AND DELIVERY | Facility: HOSPITAL | Age: 21
End: 2021-06-19

## 2021-06-19 ENCOUNTER — HOSPITAL ENCOUNTER (INPATIENT)
Facility: HOSPITAL | Age: 21
LOS: 2 days | Discharge: HOME | End: 2021-06-21
Attending: OBSTETRICS & GYNECOLOGY | Admitting: OBSTETRICS & GYNECOLOGY

## 2021-06-19 DIAGNOSIS — O45.93 PLACENTAL ABRUPTION, THIRD TRIMESTER: ICD-10-CM

## 2021-06-19 DIAGNOSIS — O36.8390 ABNORMALITY IN FETAL HEART RATE OR RHYTHM, ANTEPARTUM: Primary | ICD-10-CM

## 2021-06-19 DIAGNOSIS — Z98.890 POSTOPERATIVE STATE: ICD-10-CM

## 2021-06-19 LAB
ABO GROUP BLD: NORMAL
AMPHET+METHAMPHET UR QL: NEGATIVE
AMPHETAMINES UR QL: NEGATIVE
BARBITURATES UR QL SCN: NEGATIVE
BENZODIAZ UR QL SCN: NEGATIVE
BLD GP AB SCN SERPL QL: NEGATIVE
BUPRENORPHINE SERPL-MCNC: NEGATIVE NG/ML
CANNABINOIDS SERPL QL: NEGATIVE
COCAINE UR QL: NEGATIVE
DEPRECATED RDW RBC AUTO: 46.5 FL (ref 37–54)
ERYTHROCYTE [DISTWIDTH] IN BLOOD BY AUTOMATED COUNT: 14.6 % (ref 12.3–15.4)
HCT VFR BLD AUTO: 36.5 % (ref 34–46.6)
HGB BLD-MCNC: 12.7 G/DL (ref 12–15.9)
Lab: NORMAL
MCH RBC QN AUTO: 30.8 PG (ref 26.6–33)
MCHC RBC AUTO-ENTMCNC: 34.8 G/DL (ref 31.5–35.7)
MCV RBC AUTO: 88.6 FL (ref 79–97)
METHADONE UR QL SCN: NEGATIVE
OPIATES UR QL: NEGATIVE
OXYCODONE UR QL SCN: NEGATIVE
PCP UR QL SCN: NEGATIVE
PLATELET # BLD AUTO: 159 10*3/MM3 (ref 140–450)
PMV BLD AUTO: 12 FL (ref 6–12)
PROPOXYPH UR QL: NEGATIVE
RBC # BLD AUTO: 4.12 10*6/MM3 (ref 3.77–5.28)
RH BLD: POSITIVE
T&S EXPIRATION DATE: NORMAL
TRICYCLICS UR QL SCN: NEGATIVE
WBC # BLD AUTO: 14.51 10*3/MM3 (ref 3.4–10.8)

## 2021-06-19 PROCEDURE — 59514 CESAREAN DELIVERY ONLY: CPT | Performed by: OBSTETRICS & GYNECOLOGY

## 2021-06-19 PROCEDURE — 85027 COMPLETE CBC AUTOMATED: CPT | Performed by: OBSTETRICS & GYNECOLOGY

## 2021-06-19 PROCEDURE — 86901 BLOOD TYPING SEROLOGIC RH(D): CPT | Performed by: OBSTETRICS & GYNECOLOGY

## 2021-06-19 PROCEDURE — 25010000002 BUTORPHANOL PER 1 MG: Performed by: OBSTETRICS & GYNECOLOGY

## 2021-06-19 PROCEDURE — 86850 RBC ANTIBODY SCREEN: CPT | Performed by: OBSTETRICS & GYNECOLOGY

## 2021-06-19 PROCEDURE — 25010000003 MORPHINE PER 10 MG: Performed by: OBSTETRICS & GYNECOLOGY

## 2021-06-19 PROCEDURE — 25010000002 AZITHROMYCIN 500 MG/250 ML: Performed by: OBSTETRICS & GYNECOLOGY

## 2021-06-19 PROCEDURE — 25010000002 ONDANSETRON PER 1 MG: Performed by: OBSTETRICS & GYNECOLOGY

## 2021-06-19 PROCEDURE — 88307 TISSUE EXAM BY PATHOLOGIST: CPT

## 2021-06-19 PROCEDURE — 94799 UNLISTED PULMONARY SVC/PX: CPT

## 2021-06-19 PROCEDURE — C1755 CATHETER, INTRASPINAL: HCPCS | Performed by: NURSE ANESTHETIST, CERTIFIED REGISTERED

## 2021-06-19 PROCEDURE — 25010000002 MIDAZOLAM PER 1 MG: Performed by: NURSE ANESTHETIST, CERTIFIED REGISTERED

## 2021-06-19 PROCEDURE — 80306 DRUG TEST PRSMV INSTRMNT: CPT | Performed by: OBSTETRICS & GYNECOLOGY

## 2021-06-19 PROCEDURE — 86900 BLOOD TYPING SEROLOGIC ABO: CPT | Performed by: OBSTETRICS & GYNECOLOGY

## 2021-06-19 PROCEDURE — 25010000002 CEFAZOLIN PER 500 MG: Performed by: OBSTETRICS & GYNECOLOGY

## 2021-06-19 PROCEDURE — 59514 CESAREAN DELIVERY ONLY: CPT | Performed by: SPECIALIST/TECHNOLOGIST, OTHER

## 2021-06-19 RX ORDER — SODIUM CHLORIDE 0.9 % (FLUSH) 0.9 %
3-10 SYRINGE (ML) INJECTION AS NEEDED
Status: DISCONTINUED | OUTPATIENT
Start: 2021-06-19 | End: 2021-06-20

## 2021-06-19 RX ORDER — SODIUM CHLORIDE, SODIUM LACTATE, POTASSIUM CHLORIDE, CALCIUM CHLORIDE 600; 310; 30; 20 MG/100ML; MG/100ML; MG/100ML; MG/100ML
INJECTION, SOLUTION INTRAVENOUS
Status: DISCONTINUED
Start: 2021-06-19 | End: 2021-06-19 | Stop reason: WASHOUT

## 2021-06-19 RX ORDER — LIDOCAINE HYDROCHLORIDE 20 MG/ML
INJECTION, SOLUTION EPIDURAL; INFILTRATION; INTRACAUDAL; PERINEURAL AS NEEDED
Status: DISCONTINUED | OUTPATIENT
Start: 2021-06-19 | End: 2021-06-19 | Stop reason: SURG

## 2021-06-19 RX ORDER — PROMETHAZINE HYDROCHLORIDE 12.5 MG/1
12.5 SUPPOSITORY RECTAL EVERY 6 HOURS PRN
Status: DISCONTINUED | OUTPATIENT
Start: 2021-06-19 | End: 2021-06-19 | Stop reason: SDUPTHER

## 2021-06-19 RX ORDER — DEXTROSE, SODIUM CHLORIDE, SODIUM LACTATE, POTASSIUM CHLORIDE, AND CALCIUM CHLORIDE 5; .6; .31; .03; .02 G/100ML; G/100ML; G/100ML; G/100ML; G/100ML
125 INJECTION, SOLUTION INTRAVENOUS CONTINUOUS
Status: DISCONTINUED | OUTPATIENT
Start: 2021-06-19 | End: 2021-06-21 | Stop reason: HOSPADM

## 2021-06-19 RX ORDER — DIPHENHYDRAMINE HYDROCHLORIDE 50 MG/ML
25 INJECTION INTRAMUSCULAR; INTRAVENOUS EVERY 6 HOURS PRN
Status: DISCONTINUED | OUTPATIENT
Start: 2021-06-19 | End: 2021-06-20

## 2021-06-19 RX ORDER — LIDOCAINE HYDROCHLORIDE AND EPINEPHRINE 15; 5 MG/ML; UG/ML
INJECTION, SOLUTION EPIDURAL AS NEEDED
Status: DISCONTINUED | OUTPATIENT
Start: 2021-06-19 | End: 2021-06-19 | Stop reason: SURG

## 2021-06-19 RX ORDER — MORPHINE SULFATE 1 MG/ML
INJECTION INTRAVENOUS CONTINUOUS
Status: DISCONTINUED | OUTPATIENT
Start: 2021-06-19 | End: 2021-06-20

## 2021-06-19 RX ORDER — MIDAZOLAM HYDROCHLORIDE 1 MG/ML
INJECTION INTRAMUSCULAR; INTRAVENOUS AS NEEDED
Status: DISCONTINUED | OUTPATIENT
Start: 2021-06-19 | End: 2021-06-19 | Stop reason: SURG

## 2021-06-19 RX ORDER — PROMETHAZINE HYDROCHLORIDE 12.5 MG/1
12.5 SUPPOSITORY RECTAL EVERY 6 HOURS PRN
Status: DISCONTINUED | OUTPATIENT
Start: 2021-06-19 | End: 2021-06-21 | Stop reason: HOSPADM

## 2021-06-19 RX ORDER — TRISODIUM CITRATE DIHYDRATE AND CITRIC ACID MONOHYDRATE 500; 334 MG/5ML; MG/5ML
SOLUTION ORAL
Status: COMPLETED
Start: 2021-06-19 | End: 2021-06-19

## 2021-06-19 RX ORDER — ONDANSETRON 4 MG/1
4 TABLET, FILM COATED ORAL EVERY 6 HOURS PRN
Status: DISCONTINUED | OUTPATIENT
Start: 2021-06-19 | End: 2021-06-20

## 2021-06-19 RX ORDER — BUPIVACAINE HCL/0.9 % NACL/PF 0.1 %
2 PLASTIC BAG, INJECTION (ML) EPIDURAL ONCE
Status: COMPLETED | OUTPATIENT
Start: 2021-06-19 | End: 2021-06-19

## 2021-06-19 RX ORDER — OXYTOCIN/0.9 % SODIUM CHLORIDE 30/500 ML
85 PLASTIC BAG, INJECTION (ML) INTRAVENOUS ONCE
Status: CANCELLED | OUTPATIENT
Start: 2021-06-19 | End: 2021-06-19

## 2021-06-19 RX ORDER — MISOPROSTOL 200 UG/1
800 TABLET ORAL AS NEEDED
Status: DISCONTINUED | OUTPATIENT
Start: 2021-06-19 | End: 2021-06-20

## 2021-06-19 RX ORDER — PROMETHAZINE HYDROCHLORIDE 12.5 MG/1
12.5 TABLET ORAL EVERY 6 HOURS PRN
Status: DISCONTINUED | OUTPATIENT
Start: 2021-06-19 | End: 2021-06-19 | Stop reason: SDUPTHER

## 2021-06-19 RX ORDER — ONDANSETRON 2 MG/ML
4 INJECTION INTRAMUSCULAR; INTRAVENOUS EVERY 6 HOURS PRN
Status: DISCONTINUED | OUTPATIENT
Start: 2021-06-19 | End: 2021-06-20

## 2021-06-19 RX ORDER — TRISODIUM CITRATE DIHYDRATE AND CITRIC ACID MONOHYDRATE 500; 334 MG/5ML; MG/5ML
30 SOLUTION ORAL ONCE
Status: COMPLETED | OUTPATIENT
Start: 2021-06-19 | End: 2021-06-19

## 2021-06-19 RX ORDER — BUTORPHANOL TARTRATE 1 MG/ML
1 INJECTION, SOLUTION INTRAMUSCULAR; INTRAVENOUS
Status: DISCONTINUED | OUTPATIENT
Start: 2021-06-19 | End: 2021-06-20 | Stop reason: HOSPADM

## 2021-06-19 RX ORDER — NALOXONE HCL 0.4 MG/ML
0.1 VIAL (ML) INJECTION
Status: DISCONTINUED | OUTPATIENT
Start: 2021-06-19 | End: 2021-06-20

## 2021-06-19 RX ORDER — OXYTOCIN/0.9 % SODIUM CHLORIDE 30/500 ML
2-20 PLASTIC BAG, INJECTION (ML) INTRAVENOUS
Status: DISCONTINUED | OUTPATIENT
Start: 2021-06-19 | End: 2021-06-20 | Stop reason: HOSPADM

## 2021-06-19 RX ORDER — PROMETHAZINE HYDROCHLORIDE 12.5 MG/1
12.5 TABLET ORAL EVERY 6 HOURS PRN
Status: DISCONTINUED | OUTPATIENT
Start: 2021-06-19 | End: 2021-06-21 | Stop reason: HOSPADM

## 2021-06-19 RX ORDER — LIDOCAINE HYDROCHLORIDE 10 MG/ML
5 INJECTION, SOLUTION EPIDURAL; INFILTRATION; INTRACAUDAL; PERINEURAL AS NEEDED
Status: DISCONTINUED | OUTPATIENT
Start: 2021-06-19 | End: 2021-06-19 | Stop reason: SDUPTHER

## 2021-06-19 RX ORDER — LIDOCAINE HYDROCHLORIDE 10 MG/ML
5 INJECTION, SOLUTION EPIDURAL; INFILTRATION; INTRACAUDAL; PERINEURAL AS NEEDED
Status: DISCONTINUED | OUTPATIENT
Start: 2021-06-19 | End: 2021-06-21 | Stop reason: HOSPADM

## 2021-06-19 RX ORDER — MISOPROSTOL 100 MCG
25 TABLET ORAL ONCE
Status: DISCONTINUED | OUTPATIENT
Start: 2021-06-19 | End: 2021-06-20 | Stop reason: HOSPADM

## 2021-06-19 RX ORDER — SODIUM CHLORIDE 0.9 % (FLUSH) 0.9 %
3 SYRINGE (ML) INJECTION EVERY 12 HOURS SCHEDULED
Status: DISCONTINUED | OUTPATIENT
Start: 2021-06-19 | End: 2021-06-21 | Stop reason: HOSPADM

## 2021-06-19 RX ORDER — CARBOPROST TROMETHAMINE 250 UG/ML
250 INJECTION, SOLUTION INTRAMUSCULAR AS NEEDED
Status: DISCONTINUED | OUTPATIENT
Start: 2021-06-19 | End: 2021-06-20

## 2021-06-19 RX ORDER — SODIUM CHLORIDE, SODIUM LACTATE, POTASSIUM CHLORIDE, CALCIUM CHLORIDE 600; 310; 30; 20 MG/100ML; MG/100ML; MG/100ML; MG/100ML
INJECTION, SOLUTION INTRAVENOUS CONTINUOUS PRN
Status: DISCONTINUED | OUTPATIENT
Start: 2021-06-19 | End: 2021-06-19 | Stop reason: SURG

## 2021-06-19 RX ORDER — OXYTOCIN/0.9 % SODIUM CHLORIDE 30/500 ML
650 PLASTIC BAG, INJECTION (ML) INTRAVENOUS ONCE
Status: COMPLETED | OUTPATIENT
Start: 2021-06-19 | End: 2021-06-19

## 2021-06-19 RX ORDER — METHYLERGONOVINE MALEATE 0.2 MG/ML
200 INJECTION INTRAVENOUS ONCE AS NEEDED
Status: DISCONTINUED | OUTPATIENT
Start: 2021-06-19 | End: 2021-06-20

## 2021-06-19 RX ORDER — KETAMINE HYDROCHLORIDE 100 MG/ML
INJECTION INTRAMUSCULAR; INTRAVENOUS AS NEEDED
Status: DISCONTINUED | OUTPATIENT
Start: 2021-06-19 | End: 2021-06-19 | Stop reason: SURG

## 2021-06-19 RX ORDER — SODIUM CHLORIDE 0.9 % (FLUSH) 0.9 %
3 SYRINGE (ML) INJECTION EVERY 12 HOURS SCHEDULED
Status: DISCONTINUED | OUTPATIENT
Start: 2021-06-19 | End: 2021-06-20 | Stop reason: HOSPADM

## 2021-06-19 RX ORDER — OXYTOCIN/0.9 % SODIUM CHLORIDE 30/500 ML
85 PLASTIC BAG, INJECTION (ML) INTRAVENOUS ONCE
Status: COMPLETED | OUTPATIENT
Start: 2021-06-19 | End: 2021-06-19

## 2021-06-19 RX ORDER — OXYTOCIN/0.9 % SODIUM CHLORIDE 30/500 ML
650 PLASTIC BAG, INJECTION (ML) INTRAVENOUS ONCE
Status: CANCELLED | OUTPATIENT
Start: 2021-06-19 | End: 2021-06-19

## 2021-06-19 RX ORDER — PANTOPRAZOLE SODIUM 40 MG/10ML
40 INJECTION, POWDER, LYOPHILIZED, FOR SOLUTION INTRAVENOUS ONCE
Status: COMPLETED | OUTPATIENT
Start: 2021-06-19 | End: 2021-06-19

## 2021-06-19 RX ORDER — BUPIVACAINE HYDROCHLORIDE 2.5 MG/ML
INJECTION, SOLUTION EPIDURAL; INFILTRATION; INTRACAUDAL AS NEEDED
Status: DISCONTINUED | OUTPATIENT
Start: 2021-06-19 | End: 2021-06-19 | Stop reason: SURG

## 2021-06-19 RX ORDER — DEXTROSE, SODIUM CHLORIDE, SODIUM LACTATE, POTASSIUM CHLORIDE, AND CALCIUM CHLORIDE 5; .6; .31; .03; .02 G/100ML; G/100ML; G/100ML; G/100ML; G/100ML
INJECTION, SOLUTION INTRAVENOUS
Status: COMPLETED
Start: 2021-06-19 | End: 2021-06-19

## 2021-06-19 RX ADMIN — SODIUM CITRATE AND CITRIC ACID MONOHYDRATE 30 ML: 500; 334 SOLUTION ORAL at 20:57

## 2021-06-19 RX ADMIN — OXYTOCIN-SODIUM CHLORIDE 0.9% IV SOLN 30 UNIT/500ML 85 ML/HR: 30-0.9/5 SOLUTION at 22:59

## 2021-06-19 RX ADMIN — SODIUM CHLORIDE, SODIUM LACTATE, POTASSIUM CHLORIDE, CALCIUM CHLORIDE AND DEXTROSE MONOHYDRATE 125 ML/HR: 5; 600; 310; 30; 20 INJECTION, SOLUTION INTRAVENOUS at 12:52

## 2021-06-19 RX ADMIN — Medication 10 ML/HR: at 18:17

## 2021-06-19 RX ADMIN — OXYTOCIN-SODIUM CHLORIDE 0.9% IV SOLN 30 UNIT/500ML 650 ML/HR: 30-0.9/5 SOLUTION at 21:38

## 2021-06-19 RX ADMIN — KETAMINE HYDROCHLORIDE 10 MG: 100 INJECTION INTRAMUSCULAR; INTRAVENOUS at 22:07

## 2021-06-19 RX ADMIN — AZITHROMYCIN 500 MG: 500 INJECTION, POWDER, LYOPHILIZED, FOR SOLUTION INTRAVENOUS at 21:42

## 2021-06-19 RX ADMIN — OXYTOCIN-SODIUM CHLORIDE 0.9% IV SOLN 30 UNIT/500ML 2 MILLI-UNITS/MIN: 30-0.9/5 SOLUTION at 07:41

## 2021-06-19 RX ADMIN — BUTORPHANOL TARTRATE 1 MG: 1 INJECTION, SOLUTION INTRAMUSCULAR; INTRAVENOUS at 14:41

## 2021-06-19 RX ADMIN — KETAMINE HYDROCHLORIDE 20 MG: 100 INJECTION INTRAMUSCULAR; INTRAVENOUS at 21:49

## 2021-06-19 RX ADMIN — BUPIVACAINE HYDROCHLORIDE 5 ML: 2.5 INJECTION, SOLUTION EPIDURAL; INFILTRATION; INTRACAUDAL; PERINEURAL at 21:01

## 2021-06-19 RX ADMIN — MORPHINE SULFATE: 1 INJECTION INTRAVENOUS at 23:12

## 2021-06-19 RX ADMIN — BUPIVACAINE HYDROCHLORIDE 7 ML: 2.5 INJECTION, SOLUTION EPIDURAL; INFILTRATION; INTRACAUDAL; PERINEURAL at 18:11

## 2021-06-19 RX ADMIN — LIDOCAINE HYDROCHLORIDE 10 ML: 20 INJECTION, SOLUTION EPIDURAL; INFILTRATION; INTRACAUDAL; PERINEURAL at 21:01

## 2021-06-19 RX ADMIN — MIDAZOLAM HYDROCHLORIDE 2 MG: 2 INJECTION, SOLUTION INTRAMUSCULAR; INTRAVENOUS at 21:36

## 2021-06-19 RX ADMIN — SODIUM CHLORIDE, SODIUM LACTATE, POTASSIUM CHLORIDE, CALCIUM CHLORIDE AND DEXTROSE MONOHYDRATE 125 ML/HR: 5; 600; 310; 30; 20 INJECTION, SOLUTION INTRAVENOUS at 05:36

## 2021-06-19 RX ADMIN — SODIUM CHLORIDE, SODIUM LACTATE, POTASSIUM CHLORIDE, CALCIUM CHLORIDE AND DEXTROSE MONOHYDRATE 125 ML/HR: 5; 600; 310; 30; 20 INJECTION, SOLUTION INTRAVENOUS at 17:57

## 2021-06-19 RX ADMIN — TRISODIUM CITRATE DIHYDRATE AND CITRIC ACID MONOHYDRATE 30 ML: 500; 334 SOLUTION ORAL at 20:57

## 2021-06-19 RX ADMIN — PANTOPRAZOLE SODIUM 40 MG: 40 INJECTION, POWDER, FOR SOLUTION INTRAVENOUS at 18:52

## 2021-06-19 RX ADMIN — Medication 2 G: at 21:28

## 2021-06-19 RX ADMIN — KETAMINE HYDROCHLORIDE 10 MG: 100 INJECTION INTRAMUSCULAR; INTRAVENOUS at 21:57

## 2021-06-19 RX ADMIN — SODIUM CHLORIDE, POTASSIUM CHLORIDE, SODIUM LACTATE AND CALCIUM CHLORIDE: 600; 310; 30; 20 INJECTION, SOLUTION INTRAVENOUS at 21:54

## 2021-06-19 RX ADMIN — LIDOCAINE HYDROCHLORIDE AND EPINEPHRINE 3 ML: 15; 5 INJECTION, SOLUTION EPIDURAL at 18:05

## 2021-06-19 RX ADMIN — ONDANSETRON HYDROCHLORIDE 4 MG: 2 INJECTION INTRAMUSCULAR; INTRAVENOUS at 21:01

## 2021-06-19 RX ADMIN — KETAMINE HYDROCHLORIDE 10 MG: 100 INJECTION INTRAMUSCULAR; INTRAVENOUS at 21:47

## 2021-06-19 RX ADMIN — SODIUM CHLORIDE, SODIUM LACTATE, POTASSIUM CHLORIDE, CALCIUM CHLORIDE AND DEXTROSE MONOHYDRATE 125 ML/HR: 5; 600; 310; 30; 20 INJECTION, SOLUTION INTRAVENOUS at 18:58

## 2021-06-19 RX ADMIN — SODIUM CHLORIDE, POTASSIUM CHLORIDE, SODIUM LACTATE AND CALCIUM CHLORIDE 1000 ML: 600; 310; 30; 20 INJECTION, SOLUTION INTRAVENOUS at 20:57

## 2021-06-19 NOTE — ANESTHESIA PROCEDURE NOTES
Labor Epidural      Patient reassessed immediately prior to procedure    Patient location during procedure: OB  Indication:at surgeon's request  Performed By  CRNA: Luís Alfred CRNA  Preanesthetic Checklist  Completed: patient identified, IV checked, site marked, risks and benefits discussed, surgical consent, monitors and equipment checked, pre-op evaluation and timeout performed  Prep:  Pt Position:sitting  Sterile Tech:cap, gloves, mask and sterile barrier  Prep:chlorhexidine gluconate and isopropyl alcohol  Monitoring:blood pressure monitoring and continuous pulse oximetry  Epidural Block Procedure:  Approach:midline  Guidance:landmark technique  Location:L4-L5  Needle Type:Tuohy  Needle Gauge:17 G  Loss of Resistance Medium: saline  Loss of Resistance: 5cm  Cath Depth at skin:9 cm  Paresthesia: none  Aspiration:negative  Test Dose:negative  Number of Attempts: 1  Post Assessment:  Dressing:occlusive dressing applied and secured with tape  Pt Tolerance:patient tolerated the procedure well with no apparent complications  Complications:no

## 2021-06-19 NOTE — PAYOR COMM NOTE
"Fiorella Salmeron (20 y.o. Female)     Date of Birth Social Security Number Address Home Phone MRN    [id number]  [id number] [facility] [place] [phone] [id number]    [employer] Mar[employer] Status          None Single       Admission Date Admission Type Admitting Provider Attending Provider Department, Room/Bed    21 Elective Bhargav Bond MD Neely, Thomas S, MD [facility] LABOR DELIVERY, L768/1    Discharge Date Discharge Disposition Discharge Destination                       Attending Provider: Bhargav Bond MD    Allergies: No Known Allergies    Isolation: None   Infection: None   Code Status: CPR    Ht: 152.4 cm (60\")   Wt: 64.9 kg (143 lb)    Admission Cmt: None   Principal Problem: None                Active Insurance as of 2021     Primary Coverage     Payor Plan Insurance Group Employer/Plan Group    [employer] [place] [employer] [place]ICA[place]      Payor Plan Address Payor Plan Phone Number Payor Plan Fax Number Effective Dates    [address] [address] [phone]  2020 - None Entered    [facility] [address]       Subscriber Name Subscriber Birth Date Member ID       FIORELLA SALMERON 2000 [id number]                 Emergency Contacts      (Rel.) Home Phone Work Phone Mobile Phone    victor manuel hernandez (Friend) -- -- [phone]               History & Physical      Bhargav Bond MD at 21 0747         Ultrasound shows still vertex presentation.  Examination shows now 1 to 2 cm.  After reviewing risk benefits alternatives cervical balloon of Cook type placed inflated 60/40 we will start oxytocin    Electronically signed by Bhargav Bond MD at 21 0748   Source Note            Obstetric History and Physical    Chief complaint possible partial placental abruption.        Patient is a 20 y.o. female  currently at 39w0d, who presents with concern for partial placental abruption.  Patient had had bleeding and contractions " at about 34 weeks the bleeding with resolved.  Concern for partial placental abruption.  Placental abruption had not been demonstrated ultrasonographically but it is known that the sensitivity placental abruption ultrasonographically is not 100%.  She had not followed with biophysical profiles but had not for the last couple weeks that she said she had had a death in the family and had not followed up despite the importance of follow-up pending been emphasized to her.    Her prenatal care is has been followed up through [facility] with in adequate prenatal care     Obstetric History   # 1 - Date: None, Sex: None, Weight: None, GA: None, Delivery: None, Apgar1: None, Apgar5: None, Living: None, Birth Comments: None       The following portions of the patients history were reviewed and updated as appropriate: current medications, allergies, past medical history, past surgical history, past family history, past social history and problem list .       Prenatal Information:  Prenatal Results     POC Urine Glucose/Protein     Test Value Reference Range Date Time    Urine Glucose        Urine Protein              Initial Prenatal Labs     Test Value Reference Range Date Time    Hemoglobin  12.6 g/dL 12.0 - 15.9 11/16/20 1347    Hematocrit  35.4 % 34.0 - 46.6 11/16/20 1347    Platelets  131 10*3/mm3 140 - 450 05/02/21 0636       144 10*3/mm3 140 - 450 05/01/21 0614       144 10*3/mm3 140 - 450 04/30/21 0322       135 10*3/mm3 140 - 450 04/29/21 2100       139 10*3/mm3 140 - 450 04/17/21 1332       142 10*3/mm3 140 - 450 03/22/21 0958       225 10*3/mm3 140 - 450 11/16/20 1347    Rubella IgG  Equivocal   11/16/20 1347    Hepatitis B SAg  Non-Reactive  Non-Reactive 11/16/20 1347    Hepatitis C Ab  Non-Reactive  Non-Reactive 11/16/20 1347    RPR  Non-Reactive  Non-Reactive 11/16/20 1347    ABO  A   11/16/20 1347    Rh  Positive   11/16/20 1347    Antibody Screen  Negative   11/16/20 1347    HIV  Non-Reactive   Non-Reactive 11/16/20 1347    Urine Culture  No growth   11/16/20 1347    Gonorrhea  Negative  Negative 11/16/20 1347    Chlamydia  Negative  Negative 11/16/20 1347    TSH              2nd and 3rd Trimester     Test Value Reference Range Date Time    Hemoglobin (repeated)  11.1 g/dL 12.0 - 15.9 05/02/21 0636       11.6 g/dL 12.0 - 15.9 05/01/21 0614       12.0 g/dL 12.0 - 15.9 04/30/21 0322       11.7 g/dL 12.0 - 15.9 04/29/21 2100       11.8 g/dL 12.0 - 15.9 04/17/21 1332       12.2 g/dL 12.0 - 15.9 03/22/21 0958    Hematocrit (repeated)  31.9 % 34.0 - 46.6 05/02/21 0636       33.0 % 34.0 - 46.6 05/01/21 0614       33.5 % 34.0 - 46.6 04/30/21 0322       33.6 % 34.0 - 46.6 04/29/21 2100       34.2 % 34.0 - 46.6 04/17/21 1332       34.7 % 34.0 - 46.6 03/22/21 0958    GCT  95 mg/dL 60 - 140 03/22/21 0958    Antibody Screen (repeated)        GTT Fasting        GTT 1 Hr        GTT 2 Hr        GTT 3 Hr        Group B Strep  Negative  Negative 05/27/21 1403          Drug Screening     Test Value Reference Range Date Time    Amphetamine Screen  Negative  Negative 04/17/21 1324       Negative  Negative 11/16/20 1347    Barbiturate Screen  Negative  Negative 04/17/21 1324       Negative  Negative 11/16/20 1347    Benzodiazepine Screen  Negative  Negative 04/17/21 1324       Negative  Negative 11/16/20 1347    Methadone Screen  Negative  Negative 04/17/21 1324       Negative  Negative 11/16/20 1347    Phencyclidine Screen  Negative  Negative 04/17/21 1324       Negative  Negative 11/16/20 1347    Opiates Screen  Negative  Negative 04/17/21 1324       Negative  Negative 11/16/20 1347    THC Screen  Negative  Negative 04/17/21 1324       Negative  Negative 11/16/20 1347    Cocaine Screen  Negative  Negative 04/17/21 1324       Negative  Negative 11/16/20 1347    Propoxyphene Screen  Negative  Negative 04/17/21 1324       Negative  Negative 11/16/20 1347    Buprenorphine Screen  Negative  Negative 04/17/21 1324       Negative   Negative 11/16/20 1347    Methamphetamine Screen  Negative  Negative 04/17/21 1324       Negative  Negative 11/16/20 1347    Oxycodone Screen  Negative  Negative 04/17/21 1324       Negative  Negative 11/16/20 1347    Tricyclic Antidepressants Screen  Negative  Negative 04/17/21 1324       Negative  Negative 11/16/20 1347          Other (Risk screening)     Test Value Reference Range Date Time    Varicella IgG  Negative  Positive, Equivocal 11/16/20 1347    Parvovirus IgG        CMV IgG        Cystic Fibrosis        Hemoglobin electrophoresis        NIPT        MSAFP-4        AFP (for NTD only)              Legend    ^: Historical                      External Prenatal Results     Pregnancy Outside Results - Transcribed From Office Records - See Scanned Records For Details     Test Value Date Time    ABO  A  11/16/20 1347    Rh  Positive  11/16/20 1347    Antibody Screen  Negative  11/16/20 1347    Varicella IgG  Negative  11/16/20 1347    Rubella  Equivocal  11/16/20 1347    Hgb  11.1 g/dL 05/02/21 0636       11.6 g/dL 05/01/21 0614       12.0 g/dL 04/30/21 0322       11.7 g/dL 04/29/21 2100       11.8 g/dL 04/17/21 1332       12.2 g/dL 03/22/21 0958       12.6 g/dL 11/16/20 1347    Hct  31.9 % 05/02/21 0636       33.0 % 05/01/21 0614       33.5 % 04/30/21 0322       33.6 % 04/29/21 2100       34.2 % 04/17/21 1332       34.7 % 03/22/21 0958       35.4 % 11/16/20 1347    Glucose Fasting GTT       Glucose Tolerance Test 1 hour       Glucose Tolerance Test 3 hour       Gonorrhea (discrete)  Negative  11/16/20 1347    Chlamydia (discrete)  Negative  11/16/20 1347    RPR  Non-Reactive  11/16/20 1347    VDRL       Syphilis Antibody       HBsAg  Non-Reactive  11/16/20 1347    Herpes Simplex Virus PCR       Herpes Simplex VIrus Culture       HIV  Non-Reactive  11/16/20 1347    Hep C RNA Quant PCR       Hep C Antibody  Non-Reactive  11/16/20 1347    AFP       Group B Strep  Negative  05/27/21 1403    GBS Susceptibility to  Clindamycin       GBS Susceptibility to Erythromycin       Fetal Fibronectin       Genetic Testing, Maternal Blood             Drug Screening     Test Value Date Time    Urine Drug Screen       Amphetamine Screen  Negative  21 1324       Negative  20 1347    Barbiturate Screen  Negative  21 1324       Negative  20 1347    Benzodiazepine Screen  Negative  21 1324       Negative  20 1347    Methadone Screen  Negative  21 1324       Negative  20 1347    Phencyclidine Screen  Negative  21 1324       Negative  20 1347    Opiates Screen  Negative  21 1324       Negative  20 1347    THC Screen  Negative  21 1324       Negative  20 1347    Cocaine Screen       Propoxyphene Screen  Negative  21 1324       Negative  20 1347    Buprenorphine Screen  Negative  21 1324       Negative  20 1347    Methamphetamine Screen       Oxycodone Screen  Negative  21 1324       Negative  20 1347    Tricyclic Antidepressants Screen  Negative  21 1324       Negative  20 1347          Legend    ^: Historical                         Past OB History:     OB History    Para Term  AB Living   1 0 0 0 0 0   SAB TAB Ectopic Molar Multiple Live Births   0 0 0 0 0 0      # Outcome Date GA Lbr Gera/2nd Weight Sex Delivery Anes PTL Lv   1 Current                 ALLERGIES:   No Known Allergies     Home Medications:     Prior to Admission medications    Medication Sig Start Date End Date Taking? Authorizing Provider   ferrous sulfate 325 (65 FE) MG tablet Take 1 tablet by mouth Daily With Breakfast. 21  Yes Bhargav Bond MD   prenatal vitamin (prenatal, CLASSIC, vitamin) tablet Take 1 tablet by mouth Daily.   Yes ProviderAmanuel MD       Past Medical History: Past Medical History:   Diagnosis Date   • Anxiety    • Migraine    • Smoker       Past Surgical History Past Surgical History:   Procedure  Laterality Date   • WISDOM TOOTH EXTRACTION        Family History: Family History   Problem Relation Age of Onset   • No Known Problems Sister    • No Known Problems Brother    • Ovarian cancer Paternal Grandmother    • No Known Problems Sister    • No Known Problems Brother    • No Known Problems Brother       Social History:  reports that she has been smoking cigarettes. She has never used smokeless tobacco.   reports previous alcohol use.   reports previous drug use.        Review of Systems                                                                                                                      Constitutional: Denies night sweats    HENT: No hearing changes, denies ear pain    Eye: No eye pain; no foreign body in eye    Pulmonary: No hemoptysis    Cardiovascular: No claudication    GI: No hematemesis    Musculoskeletal: No arthralgias, no joint swelling    Endocrine: No polydipsia or polyuria    Hematologic: Denies any free bleeding    Psychiatric: Denies any delusions      Objective     Documented Vitals    06/18/21 1138   BP: 112/78   Weight: 65 kg (143 lb 6.4 oz)          OBGyn Exam  Constitutional: Appears to be in no acute distress; Eyes: sclera normal; Endocrine system: thyroid palpate is normal; Pulmonary system: lungs clear; Cardiovascular system: heart regular rate and rhythm; Gastrointestinal system: abdomen soft nontender, active bowel sounds; Urologic system: CVA negative; Psychiatric: appropriate insight; Neurologic: gait within normal limits cervix is fingertip to 1      Last Labs  Lab Results   Component Value Date    WBC 20.75 (H) 05/02/2021    RBC 3.49 (L) 05/02/2021    HGB 11.1 (L) 05/02/2021    HCT 31.9 (L) 05/02/2021    MCV 91.4 05/02/2021    MCH 31.8 05/02/2021    MCHC 34.8 05/02/2021    RDW 13.6 05/02/2021    RDWSD 45.1 05/02/2021    MPV 12.2 (H) 05/02/2021     (L) 05/02/2021        Lab Results   Component Value Date    GLUCOSE 81 04/29/2021    BUN 5 (L) 04/29/2021     CREATININE 0.46 (L) 2021     2021    K 3.7 2021     2021    CO2 20.0 (L) 2021    CALCIUM 9.0 2021    PROTEINTOT 6.3 2021    ALBUMIN 3.90 2021    ALT 19 2021    AST 17 2021    ALKPHOS 105 2021    BILITOT 0.2 2021    EGFRIFNONA >150 2021    GLOB 2.4 2021    AGRATIO 1.6 2021    BCR 10.9 2021    ANIONGAP 10.0 2021       No results found for: HCGQUAL      Assessment/Plan:  1. 20 y.o. [id number].  Possible partial placental abruption chronic after reviewing risk benefits alternatives as she is now 39 weeks we will plan for induction of labor likely Cytotec followed by cervical balloon in induction with Pitocin risk benefits alternatives reviewed including risk of hypertonic contractions damage mother baby.  Risk of iatrogenic  section    Becky Shaffer and I have discussed pain goals for this hospitalization after reviewing her current clinical condition, medical history and prior pain experiences.  The goal is to keep her pain level 3.  To help achieve this, I plan to multimodal pain management.       This document has been electronically signed by Bhargav Bond MD on 2021 13:17 CDT\    Please note that portions of this note were completed with a voice recognition program.           Electronically signed by Bhargav Bond MD at 21 1320             Bhargav Bond MD at 21 1315            Obstetric History and Physical    Chief complaint possible partial placental abruption.        Patient is a 20 y.o. female  currently at 39w0d, who presents with concern for partial placental abruption.  Patient had had bleeding and contractions at about 34 weeks the bleeding with resolved.  Concern for partial placental abruption.  Placental abruption had not been demonstrated ultrasonographically but it is known that the sensitivity placental abruption ultrasonographically is  not 100%.  She had not followed with biophysical profiles but had not for the last couple weeks that she said she had had a death in the family and had not followed up despite the importance of follow-up pending been emphasized to her.    Her prenatal care is has been followed up through [facility] with in adequate prenatal care     Obstetric History   # 1 - Date: None, Sex: None, Weight: None, GA: None, Delivery: None, Apgar1: None, Apgar5: None, Living: None, Birth Comments: None       The following portions of the patients history were reviewed and updated as appropriate: current medications, allergies, past medical history, past surgical history, past family history, past social history and problem list .       Prenatal Information:  Prenatal Results     POC Urine Glucose/Protein     Test Value Reference Range Date Time    Urine Glucose        Urine Protein              Initial Prenatal Labs     Test Value Reference Range Date Time    Hemoglobin  12.6 g/dL 12.0 - 15.9 11/16/20 1347    Hematocrit  35.4 % 34.0 - 46.6 11/16/20 1347    Platelets  131 10*3/mm3 140 - 450 05/02/21 0636       144 10*3/mm3 140 - 450 05/01/21 0614       144 10*3/mm3 140 - 450 04/30/21 0322       135 10*3/mm3 140 - 450 04/29/21 2100       139 10*3/mm3 140 - 450 04/17/21 1332       142 10*3/mm3 140 - 450 03/22/21 0958       225 10*3/mm3 140 - 450 11/16/20 1347    Rubella IgG  Equivocal   11/16/20 1347    Hepatitis B SAg  Non-Reactive  Non-Reactive 11/16/20 1347    Hepatitis C Ab  Non-Reactive  Non-Reactive 11/16/20 1347    RPR  Non-Reactive  Non-Reactive 11/16/20 1347    ABO  A   11/16/20 1347    Rh  Positive   11/16/20 1347    Antibody Screen  Negative   11/16/20 1347    HIV  Non-Reactive  Non-Reactive 11/16/20 1347    Urine Culture  No growth   11/16/20 1347    Gonorrhea  Negative  Negative 11/16/20 1347    Chlamydia  Negative  Negative 11/16/20 1347    TSH              2nd and 3rd Trimester     Test Value Reference Range  Date Time    Hemoglobin (repeated)  11.1 g/dL 12.0 - 15.9 05/02/21 0636       11.6 g/dL 12.0 - 15.9 05/01/21 0614       12.0 g/dL 12.0 - 15.9 04/30/21 0322       11.7 g/dL 12.0 - 15.9 04/29/21 2100       11.8 g/dL 12.0 - 15.9 04/17/21 1332       12.2 g/dL 12.0 - 15.9 03/22/21 0958    Hematocrit (repeated)  31.9 % 34.0 - 46.6 05/02/21 0636       33.0 % 34.0 - 46.6 05/01/21 0614       33.5 % 34.0 - 46.6 04/30/21 0322       33.6 % 34.0 - 46.6 04/29/21 2100       34.2 % 34.0 - 46.6 04/17/21 1332       34.7 % 34.0 - 46.6 03/22/21 0958    GCT  95 mg/dL 60 - 140 03/22/21 0958    Antibody Screen (repeated)        GTT Fasting        GTT 1 Hr        GTT 2 Hr        GTT 3 Hr        Group B Strep  Negative  Negative 05/27/21 1403          Drug Screening     Test Value Reference Range Date Time    Amphetamine Screen  Negative  Negative 04/17/21 1324       Negative  Negative 11/16/20 1347    Barbiturate Screen  Negative  Negative 04/17/21 1324       Negative  Negative 11/16/20 1347    Benzodiazepine Screen  Negative  Negative 04/17/21 1324       Negative  Negative 11/16/20 1347    Methadone Screen  Negative  Negative 04/17/21 1324       Negative  Negative 11/16/20 1347    Phencyclidine Screen  Negative  Negative 04/17/21 1324       Negative  Negative 11/16/20 1347    Opiates Screen  Negative  Negative 04/17/21 1324       Negative  Negative 11/16/20 1347    THC Screen  Negative  Negative 04/17/21 1324       Negative  Negative 11/16/20 1347    Cocaine Screen  Negative  Negative 04/17/21 1324       Negative  Negative 11/16/20 1347    Propoxyphene Screen  Negative  Negative 04/17/21 1324       Negative  Negative 11/16/20 1347    Buprenorphine Screen  Negative  Negative 04/17/21 1324       Negative  Negative 11/16/20 1347    Methamphetamine Screen  Negative  Negative 04/17/21 1324       Negative  Negative 11/16/20 1347    Oxycodone Screen  Negative  Negative 04/17/21 1324       Negative  Negative 11/16/20 1347    Tricyclic  Antidepressants Screen  Negative  Negative 04/17/21 1324       Negative  Negative 11/16/20 1347          Other (Risk screening)     Test Value Reference Range Date Time    Varicella IgG  Negative  Positive, Equivocal 11/16/20 1347    Parvovirus IgG        CMV IgG        Cystic Fibrosis        Hemoglobin electrophoresis        NIPT        MSAFP-4        AFP (for NTD only)              Legend    ^: Historical                      External Prenatal Results     Pregnancy Outside Results - Transcribed From Office Records - See Scanned Records For Details     Test Value Date Time    ABO  A  11/16/20 1347    Rh  Positive  11/16/20 1347    Antibody Screen  Negative  11/16/20 1347    Varicella IgG  Negative  11/16/20 1347    Rubella  Equivocal  11/16/20 1347    Hgb  11.1 g/dL 05/02/21 0636       11.6 g/dL 05/01/21 0614       12.0 g/dL 04/30/21 0322       11.7 g/dL 04/29/21 2100       11.8 g/dL 04/17/21 1332       12.2 g/dL 03/22/21 0958       12.6 g/dL 11/16/20 1347    Hct  31.9 % 05/02/21 0636       33.0 % 05/01/21 0614       33.5 % 04/30/21 0322       33.6 % 04/29/21 2100       34.2 % 04/17/21 1332       34.7 % 03/22/21 0958       35.4 % 11/16/20 1347    Glucose Fasting GTT       Glucose Tolerance Test 1 hour       Glucose Tolerance Test 3 hour       Gonorrhea (discrete)  Negative  11/16/20 1347    Chlamydia (discrete)  Negative  11/16/20 1347    RPR  Non-Reactive  11/16/20 1347    VDRL       Syphilis Antibody       HBsAg  Non-Reactive  11/16/20 1347    Herpes Simplex Virus PCR       Herpes Simplex VIrus Culture       HIV  Non-Reactive  11/16/20 1347    Hep C RNA Quant PCR       Hep C Antibody  Non-Reactive  11/16/20 1347    AFP       Group B Strep  Negative  05/27/21 1403    GBS Susceptibility to Clindamycin       GBS Susceptibility to Erythromycin       Fetal Fibronectin       Genetic Testing, Maternal Blood             Drug Screening     Test Value Date Time    Urine Drug Screen       Amphetamine Screen  Negative   21 1324       Negative  20 1347    Barbiturate Screen  Negative  21 1324       Negative  20 1347    Benzodiazepine Screen  Negative  21 1324       Negative  20 1347    Methadone Screen  Negative  21 1324       Negative  20 1347    Phencyclidine Screen  Negative  21 1324       Negative  20 1347    Opiates Screen  Negative  21 1324       Negative  20 1347    THC Screen  Negative  21 1324       Negative  20 1347    Cocaine Screen       Propoxyphene Screen  Negative  21 1324       Negative  20 1347    Buprenorphine Screen  Negative  21 1324       Negative  20 1347    Methamphetamine Screen       Oxycodone Screen  Negative  21 1324       Negative  20 1347    Tricyclic Antidepressants Screen  Negative  21 1324       Negative  20 1347          Legend    ^: Historical                         Past OB History:     OB History    Para Term  AB Living   1 0 0 0 0 0   SAB TAB Ectopic Molar Multiple Live Births   0 0 0 0 0 0      # Outcome Date GA Lbr Gera/2nd Weight Sex Delivery Anes PTL Lv   1 Current                 ALLERGIES:   No Known Allergies     Home Medications:     Prior to Admission medications    Medication Sig Start Date End Date Taking? Authorizing Provider   ferrous sulfate 325 (65 FE) MG tablet Take 1 tablet by mouth Daily With Breakfast. 21  Yes Bhargav Bond MD   prenatal vitamin (prenatal, CLASSIC, vitamin) tablet Take 1 tablet by mouth Daily.   Yes ProviderAmanuel MD       Past Medical History: Past Medical History:   Diagnosis Date   • Anxiety    • Migraine    • Smoker       Past Surgical History Past Surgical History:   Procedure Laterality Date   • WISDOM TOOTH EXTRACTION        Family History: Family History   Problem Relation Age of Onset   • No Known Problems Sister    • No Known Problems Brother    • Ovarian cancer Paternal Grandmother    • No  Known Problems Sister    • No Known Problems Brother    • No Known Problems Brother       Social History:  reports that she has been smoking cigarettes. She has never used smokeless tobacco.   reports previous alcohol use.   reports previous drug use.        Review of Systems                                                                                                                      Constitutional: Denies night sweats    HENT: No hearing changes, denies ear pain    Eye: No eye pain; no foreign body in eye    Pulmonary: No hemoptysis    Cardiovascular: No claudication    GI: No hematemesis    Musculoskeletal: No arthralgias, no joint swelling    Endocrine: No polydipsia or polyuria    Hematologic: Denies any free bleeding    Psychiatric: Denies any delusions      Objective     Documented Vitals    06/18/21 1138   BP: 112/78   Weight: 65 kg (143 lb 6.4 oz)          OBGyn Exam  Constitutional: Appears to be in no acute distress; Eyes: sclera normal; Endocrine system: thyroid palpate is normal; Pulmonary system: lungs clear; Cardiovascular system: heart regular rate and rhythm; Gastrointestinal system: abdomen soft nontender, active bowel sounds; Urologic system: CVA negative; Psychiatric: appropriate insight; Neurologic: gait within normal limits cervix is fingertip to 1      Last Labs  Lab Results   Component Value Date    WBC 20.75 (H) 05/02/2021    RBC 3.49 (L) 05/02/2021    HGB 11.1 (L) 05/02/2021    HCT 31.9 (L) 05/02/2021    MCV 91.4 05/02/2021    MCH 31.8 05/02/2021    MCHC 34.8 05/02/2021    RDW 13.6 05/02/2021    RDWSD 45.1 05/02/2021    MPV 12.2 (H) 05/02/2021     (L) 05/02/2021        Lab Results   Component Value Date    GLUCOSE 81 04/29/2021    BUN 5 (L) 04/29/2021    CREATININE 0.46 (L) 04/29/2021     04/29/2021    K 3.7 04/29/2021     04/29/2021    CO2 20.0 (L) 04/29/2021    CALCIUM 9.0 04/29/2021    PROTEINTOT 6.3 04/29/2021    ALBUMIN 3.90 04/29/2021    ALT 19 04/29/2021     AST 17 2021    ALKPHOS 105 2021    BILITOT 0.2 2021    EGFRIFNONA >150 2021    GLOB 2.4 2021    AGRATIO 1.6 2021    BCR 10.9 2021    ANIONGAP 10.0 2021       No results found for: HCGQUAL      Assessment/Plan:  2. 20 y.o. [id number].  Possible partial placental abruption chronic after reviewing risk benefits alternatives as she is now 39 weeks we will plan for induction of labor likely Cytotec followed by cervical balloon in induction with Pitocin risk benefits alternatives reviewed including risk of hypertonic contractions damage mother baby.  Risk of iatrogenic  section    Becky Shaffer and I have discussed pain goals for this hospitalization after reviewing her current clinical condition, medical history and prior pain experiences.  The goal is to keep her pain level 3.  To help achieve this, I plan to multimodal pain management.       This document has been electronically signed by Bhargav Bond MD on 2021 13:17 CDT\    Please note that portions of this note were completed with a voice recognition program.           Electronically signed by Bhargav Bond MD at 21 1320       Vital Signs (last day)     Date/Time   Temp   Temp src   Pulse   Resp   BP   Patient Position   SpO2    21 1206   97.9 (36.6)   Oral   72   18   113/79   --   --    21 1149   --   --   69   --   121/77   --   --    21 1134   --   --   78   --   117/78   --   --    21 1121   --   --   70   --   121/78   --   --    21 1106   --   --   75   --   119/71   --   --    21 1049   --   --   62   --   112/60   --   --    21 1036   --   --   73   --   101/59   --   --    21 1020   --   --   80   --   130/81   --   --    21 1005   --   --   80   --   125/78   --   --    21 0952   --   --   81   --   118/80   --   --    21   --   --   76   --      --   --    21   --   --   80   --    121/80   --   --    06/19/21 0904   --   --   70   --   113/75   --   --    06/19/21 0850   --   --   79   --   111/76   --   --    06/19/21 0836   --   --   80   --   109/73   --   --    06/19/21 0821   --   --   75   --   121/68   --   --    06/19/21 0805   --   --   76   --   104/57   --   --    06/19/21 0745   97.9 (36.6)   Oral   77   --   119/83   --   --    06/19/21 0525   98.5 (36.9)   Oral   84   --   --   --   99    06/19/21 0524   --   --   --   --   121/80   --   --                Current Facility-Administered Medications   Medication Dose Route Frequency Provider Last Rate Last Admin   • butorphanol (STADOL) injection 1 mg  1 mg Intravenous Q3H PRN Bhargav Bond MD       • butorphanol (STADOL) injection 2 mg  2 mg Intravenous Q3H PRN Bhargav Bond MD       • dextrose 5 % and lactated Ringer's infusion  125 mL/hr Intravenous Continuous Bhargav Bond  mL/hr at 06/19/21 1252 125 mL/hr at 06/19/21 1252   • lactated ringers bolus 1,000 mL  1,000 mL Intravenous Once PRN Bhargav Bond MD       • lidocaine PF 1% (XYLOCAINE) injection 5 mL  5 mL Intradermal PRN Bhargav Bond MD       • miSOPROStol (CYTOTEC) split tablet 25 mcg  25 mcg Vaginal Once Bhargav Bond MD       • oxytocin (PITOCIN) 30 units in 0.9% sodium chloride 500 mL (premix)  2-20 juan-units/min Intravenous Titrated Bhargav Bond MD 14 mL/hr at 06/19/21 1200 14 juan-units/min at 06/19/21 1200   • promethazine (PHENERGAN) suppository 12.5 mg  12.5 mg Rectal Q6H PRN Bhargav Bond MD        Or   • promethazine (PHENERGAN) tablet 12.5 mg  12.5 mg Oral Q6H PRN Bhargav Bond MD       • sodium chloride 0.9 % flush 3 mL  3 mL Intravenous Q12H Bhargav Bond MD       • sodium chloride 0.9 % flush 3-10 mL  3-10 mL Intravenous PRN Bhargav Bond MD         Physician Progress Notes (most recent note)    No notes of this type exist for this encounter.         Consult Notes (most recent note)    No notes of this type exist  for this encounter.

## 2021-06-19 NOTE — INTERVAL H&P NOTE
Ultrasound shows still vertex presentation.  Examination shows now 1 to 2 cm.  After reviewing risk benefits alternatives cervical balloon of Cook type placed inflated 60/40 we will start oxytocin

## 2021-06-19 NOTE — PLAN OF CARE
Problem: Adult Inpatient Plan of Care  Goal: Plan of Care Review  Outcome: Ongoing, Progressing  Flowsheets (Taken 6/19/2021 0842)  Progress: improving  Plan of Care Reviewed With:   patient   spouse   mother  Outcome Summary: VSS, epidural in place, membranes intact, FHR running 130s, patient tolerating labor well   Goal Outcome Evaluation:  Plan of Care Reviewed With: patient, spouse, mother        Progress: improving  Outcome Summary: VSS, epidural in place, membranes intact, FHR running 130s, patient tolerating labor well

## 2021-06-19 NOTE — ANESTHESIA PREPROCEDURE EVALUATION
Anesthesia Evaluation     Patient summary reviewed and Nursing notes reviewed   NPO Solid Status: > 8 hours  NPO Liquid Status: > 2 hours           Airway   Mallampati: II  No difficulty expected  Dental    (+) poor dentition    Pulmonary - normal exam   (+) a smoker Current Abstained day of surgery,   Cardiovascular - negative cardio ROS    Rhythm: regular  Rate: normal        Neuro/Psych  (+) headaches, psychiatric history Anxiety,     GI/Hepatic/Renal/Endo    (+)  GERD well controlled,      Musculoskeletal (-) negative ROS    Abdominal    Substance History      OB/GYN    (+) Pregnant,         Other - negative ROS                       Anesthesia Plan    ASA 2     epidural       Anesthetic plan, all risks, benefits, and alternatives have been provided, discussed and informed consent has been obtained with: patient, spouse/significant other and mother.    Plan discussed with CRNA.

## 2021-06-20 LAB
BASOPHILS # BLD AUTO: 0.04 10*3/MM3 (ref 0–0.2)
BASOPHILS NFR BLD AUTO: 0.2 % (ref 0–1.5)
DEPRECATED RDW RBC AUTO: 48.9 FL (ref 37–54)
EOSINOPHIL # BLD AUTO: 0.01 10*3/MM3 (ref 0–0.4)
EOSINOPHIL NFR BLD AUTO: 0.1 % (ref 0.3–6.2)
ERYTHROCYTE [DISTWIDTH] IN BLOOD BY AUTOMATED COUNT: 14.6 % (ref 12.3–15.4)
HCT VFR BLD AUTO: 29.4 % (ref 34–46.6)
HGB BLD-MCNC: 9.9 G/DL (ref 12–15.9)
HOLD SPECIMEN: NORMAL
IMM GRANULOCYTES # BLD AUTO: 0.12 10*3/MM3 (ref 0–0.05)
IMM GRANULOCYTES NFR BLD AUTO: 0.6 % (ref 0–0.5)
LYMPHOCYTES # BLD AUTO: 1.68 10*3/MM3 (ref 0.7–3.1)
LYMPHOCYTES NFR BLD AUTO: 9.1 % (ref 19.6–45.3)
MCH RBC QN AUTO: 30.7 PG (ref 26.6–33)
MCHC RBC AUTO-ENTMCNC: 33.7 G/DL (ref 31.5–35.7)
MCV RBC AUTO: 91 FL (ref 79–97)
MONOCYTES # BLD AUTO: 0.97 10*3/MM3 (ref 0.1–0.9)
MONOCYTES NFR BLD AUTO: 5.2 % (ref 5–12)
NEUTROPHILS NFR BLD AUTO: 15.66 10*3/MM3 (ref 1.7–7)
NEUTROPHILS NFR BLD AUTO: 84.8 % (ref 42.7–76)
NRBC BLD AUTO-RTO: 0 /100 WBC (ref 0–0.2)
PLATELET # BLD AUTO: 125 10*3/MM3 (ref 140–450)
PMV BLD AUTO: 12 FL (ref 6–12)
RBC # BLD AUTO: 3.23 10*6/MM3 (ref 3.77–5.28)
WBC # BLD AUTO: 18.48 10*3/MM3 (ref 3.4–10.8)

## 2021-06-20 PROCEDURE — 94799 UNLISTED PULMONARY SVC/PX: CPT

## 2021-06-20 PROCEDURE — 25010000002 ONDANSETRON PER 1 MG: Performed by: NURSE ANESTHETIST, CERTIFIED REGISTERED

## 2021-06-20 PROCEDURE — 51703 INSERT BLADDER CATH COMPLEX: CPT

## 2021-06-20 PROCEDURE — 99231 SBSQ HOSP IP/OBS SF/LOW 25: CPT | Performed by: OBSTETRICS & GYNECOLOGY

## 2021-06-20 PROCEDURE — 85025 COMPLETE CBC W/AUTO DIFF WBC: CPT | Performed by: OBSTETRICS & GYNECOLOGY

## 2021-06-20 PROCEDURE — 25010000002 KETOROLAC TROMETHAMINE PER 15 MG: Performed by: OBSTETRICS & GYNECOLOGY

## 2021-06-20 PROCEDURE — 94760 N-INVAS EAR/PLS OXIMETRY 1: CPT

## 2021-06-20 PROCEDURE — 99406 BEHAV CHNG SMOKING 3-10 MIN: CPT

## 2021-06-20 RX ORDER — DIPHENHYDRAMINE HYDROCHLORIDE 50 MG/ML
25 INJECTION INTRAMUSCULAR; INTRAVENOUS EVERY 4 HOURS PRN
Status: DISCONTINUED | OUTPATIENT
Start: 2021-06-20 | End: 2021-06-21 | Stop reason: HOSPADM

## 2021-06-20 RX ORDER — SIMETHICONE 80 MG
80 TABLET,CHEWABLE ORAL 4 TIMES DAILY PRN
Status: DISCONTINUED | OUTPATIENT
Start: 2021-06-20 | End: 2021-06-21 | Stop reason: HOSPADM

## 2021-06-20 RX ORDER — METHYLERGONOVINE MALEATE 0.2 MG/ML
200 INJECTION INTRAVENOUS ONCE AS NEEDED
Status: DISCONTINUED | OUTPATIENT
Start: 2021-06-20 | End: 2021-06-21 | Stop reason: HOSPADM

## 2021-06-20 RX ORDER — LANOLIN 100 %
OINTMENT (GRAM) TOPICAL
Status: DISCONTINUED | OUTPATIENT
Start: 2021-06-20 | End: 2021-06-21 | Stop reason: HOSPADM

## 2021-06-20 RX ORDER — DIPHENHYDRAMINE HCL 25 MG
25 CAPSULE ORAL EVERY 4 HOURS PRN
Status: DISCONTINUED | OUTPATIENT
Start: 2021-06-20 | End: 2021-06-21 | Stop reason: HOSPADM

## 2021-06-20 RX ORDER — NALOXONE HCL 0.4 MG/ML
0.2 VIAL (ML) INJECTION
Status: DISCONTINUED | OUTPATIENT
Start: 2021-06-20 | End: 2021-06-21 | Stop reason: HOSPADM

## 2021-06-20 RX ORDER — OXYCODONE HYDROCHLORIDE 5 MG/1
5 TABLET ORAL EVERY 6 HOURS PRN
Status: DISCONTINUED | OUTPATIENT
Start: 2021-06-20 | End: 2021-06-20

## 2021-06-20 RX ORDER — OXYCODONE HYDROCHLORIDE 5 MG/1
5 TABLET ORAL EVERY 4 HOURS PRN
Status: DISCONTINUED | OUTPATIENT
Start: 2021-06-20 | End: 2021-06-21 | Stop reason: HOSPADM

## 2021-06-20 RX ORDER — OXYCODONE HYDROCHLORIDE 5 MG/1
10 TABLET ORAL EVERY 6 HOURS PRN
Status: DISCONTINUED | OUTPATIENT
Start: 2021-06-20 | End: 2021-06-21 | Stop reason: HOSPADM

## 2021-06-20 RX ORDER — IBUPROFEN 800 MG/1
800 TABLET ORAL EVERY 8 HOURS SCHEDULED
Status: DISCONTINUED | OUTPATIENT
Start: 2021-06-21 | End: 2021-06-21 | Stop reason: HOSPADM

## 2021-06-20 RX ORDER — KETOROLAC TROMETHAMINE 30 MG/ML
30 INJECTION, SOLUTION INTRAMUSCULAR; INTRAVENOUS EVERY 6 HOURS
Status: COMPLETED | OUTPATIENT
Start: 2021-06-20 | End: 2021-06-21

## 2021-06-20 RX ORDER — CARBOPROST TROMETHAMINE 250 UG/ML
250 INJECTION, SOLUTION INTRAMUSCULAR AS NEEDED
Status: DISCONTINUED | OUTPATIENT
Start: 2021-06-20 | End: 2021-06-21 | Stop reason: HOSPADM

## 2021-06-20 RX ORDER — IBUPROFEN 800 MG/1
800 TABLET ORAL EVERY 8 HOURS SCHEDULED
Status: DISCONTINUED | OUTPATIENT
Start: 2021-06-20 | End: 2021-06-20

## 2021-06-20 RX ORDER — MISOPROSTOL 200 UG/1
800 TABLET ORAL AS NEEDED
Status: DISCONTINUED | OUTPATIENT
Start: 2021-06-20 | End: 2021-06-21 | Stop reason: HOSPADM

## 2021-06-20 RX ORDER — DIPHENHYDRAMINE HCL 25 MG
25 CAPSULE ORAL EVERY 4 HOURS PRN
Status: DISCONTINUED | OUTPATIENT
Start: 2021-06-20 | End: 2021-06-20

## 2021-06-20 RX ORDER — PRENATAL VIT/IRON FUM/FOLIC AC 27MG-0.8MG
1 TABLET ORAL DAILY
Status: DISCONTINUED | OUTPATIENT
Start: 2021-06-20 | End: 2021-06-21 | Stop reason: HOSPADM

## 2021-06-20 RX ORDER — ONDANSETRON 2 MG/ML
4 INJECTION INTRAMUSCULAR; INTRAVENOUS ONCE AS NEEDED
Status: DISPENSED | OUTPATIENT
Start: 2021-06-20 | End: 2021-06-21

## 2021-06-20 RX ORDER — HYDROCORTISONE 25 MG/G
CREAM TOPICAL 3 TIMES DAILY PRN
Status: DISCONTINUED | OUTPATIENT
Start: 2021-06-20 | End: 2021-06-21 | Stop reason: HOSPADM

## 2021-06-20 RX ADMIN — PRENATAL VIT W/ FE FUMARATE-FA TAB 27-0.8 MG 1 TABLET: 27-0.8 TAB at 08:06

## 2021-06-20 RX ADMIN — KETOROLAC TROMETHAMINE 30 MG: 30 INJECTION, SOLUTION INTRAMUSCULAR; INTRAVENOUS at 12:43

## 2021-06-20 RX ADMIN — OXYCODONE 5 MG: 5 TABLET ORAL at 16:34

## 2021-06-20 RX ADMIN — OXYCODONE 10 MG: 5 TABLET ORAL at 09:24

## 2021-06-20 RX ADMIN — ONDANSETRON 4 MG: 2 INJECTION INTRAMUSCULAR; INTRAVENOUS at 10:46

## 2021-06-20 RX ADMIN — KETOROLAC TROMETHAMINE 30 MG: 30 INJECTION, SOLUTION INTRAMUSCULAR; INTRAVENOUS at 18:45

## 2021-06-20 RX ADMIN — KETOROLAC TROMETHAMINE 30 MG: 30 INJECTION, SOLUTION INTRAMUSCULAR; INTRAVENOUS at 07:05

## 2021-06-20 RX ADMIN — OXYCODONE 5 MG: 5 TABLET ORAL at 22:22

## 2021-06-20 NOTE — PLAN OF CARE
Problem: Adult Inpatient Plan of Care  Goal: Plan of Care Review  Outcome: Ongoing, Progressing  Flowsheets (Taken 6/20/2021 1643)  Progress: improving  Plan of Care Reviewed With:   patient   mother  Outcome Summary: VSS, FF, lochia scant, PCA discontinued this am, pain controlled with PO pain medication and scheduled Toradol, torrez catheter discontinued this am, voiding without difficulty, ambulating independently to the restroom and in patient room, bonding well with infant.  Patient is hopeful for discharge home tomorrow.   Goal Outcome Evaluation:  Plan of Care Reviewed With: patient, mother        Progress: improving  Outcome Summary: VSS, FF, lochia scant, PCA discontinued this am, pain controlled with PO pain medication and scheduled Toradol, torrez catheter discontinued this am, voiding without difficulty, ambulating independently to the restroom and in patient room, bonding well with infant.  Patient is hopeful for discharge home tomorrow.

## 2021-06-20 NOTE — PAYOR COMM NOTE
"Bing ni rn [facility] fax [phone]  Cm phone [phone]  INPATIENT ADMISSION          Fiorella Salmeron (20 y.o. Female)     Date of Birth Social Security Number Address Home Phone MRN    [id number]  [address] [facility] [place] [address] [phone] [id number]    Alevism Marital Status          None Single       Admission Date Admission Type Admitting Provider Attending Provider Department, Room/Bed    6/19/21 Elective Bhargav Bond MD Neely, Thomas S, MD [facility] MOTHER BABY, M766/1    Discharge Date Discharge Disposition Discharge Destination                       Attending Provider: Bhargav Bond MD    Allergies: No Known Allergies    Isolation: None   Infection: None   Code Status: CPR    Ht: 152.4 cm (60\")   Wt: 64.9 kg (143 lb)    Admission Cmt: None   Principal Problem: Antepartum placental abruption, third trimester [O45.93] delivery C/S                Active Insurance as of 6/19/2021     Primary Coverage     Payor Plan Insurance Group Employer/Plan Group    [employer] [place] [employer] [place]      Payor Plan Address Payor Plan Phone Number Payor Plan Fax Number Effective Dates    [address] [address] [phone]  12/16/2020 - None Entered    [facility] [id number]       Subscriber Name Subscriber Birth Date Member ID       FIORELLA SALMERON 2000 [id number]                 Emergency Contacts      (Rel.) Home Phone Work Phone Mobile Phone    victor manuel hernandez (Friend) -- -- [phone]               History & Physical      Bhargav Bond MD at 06/19/21 07         Ultrasound shows still vertex presentation.  Examination shows now 1 to 2 cm.  After reviewing risk benefits alternatives cervical balloon of Cook type placed inflated 60/40 we will start oxytocin    Electronically signed by Bhargav Bond MD at 06/19/21 0876   Source Note            Obstetric History and Physical    Chief complaint possible partial placental " abruption.        Patient is a 20 y.o. female  currently at 39w0d, who presents with concern for partial placental abruption.  Patient had had bleeding and contractions at about 34 weeks the bleeding with resolved.  Concern for partial placental abruption.  Placental abruption had not been demonstrated ultrasonographically but it is known that the sensitivity placental abruption ultrasonographically is not 100%.  She had not followed with biophysical profiles but had not for the last couple weeks that she said she had had a death in the family and had not followed up despite the importance of follow-up pending been emphasized to her.    Her prenatal care is has been followed up through [facility] with in adequate prenatal care     Obstetric History   # 1 - Date: None, Sex: None, Weight: None, GA: None, Delivery: None, Apgar1: None, Apgar5: None, Living: None, Birth Comments: None       The following portions of the patients history were reviewed and updated as appropriate: current medications, allergies, past medical history, past surgical history, past family history, past social history and problem list .       Prenatal Information:  Prenatal Results     POC Urine Glucose/Protein     Test Value Reference Range Date Time    Urine Glucose        Urine Protein              Initial Prenatal Labs     Test Value Reference Range Date Time    Hemoglobin  12.6 g/dL 12.0 - 15.9 20 1347    Hematocrit  35.4 % 34.0 - 46.6 20 1347    Platelets  131 10*3/mm3 140 - 450 21 0636       144 10*3/mm3 140 - 450 21 0614       144 10*3/mm3 140 - 450 21 0322       135 10*3/mm3 140 - 450 21 2100       139 10*3/mm3 140 - 450 21 1332       142 10*3/mm3 140 - 450 21 0958       225 10*3/mm3 140 - 450 20 1347    Rubella IgG  Equivocal   20 1347    Hepatitis B SAg  Non-Reactive  Non-Reactive 20 1347    Hepatitis C Ab  Non-Reactive  Non-Reactive 20 1347     RPR  Non-Reactive  Non-Reactive 11/16/20 1347    ABO  A   11/16/20 1347    Rh  Positive   11/16/20 1347    Antibody Screen  Negative   11/16/20 1347    HIV  Non-Reactive  Non-Reactive 11/16/20 1347    Urine Culture  No growth   11/16/20 1347    Gonorrhea  Negative  Negative 11/16/20 1347    Chlamydia  Negative  Negative 11/16/20 1347    TSH              2nd and 3rd Trimester     Test Value Reference Range Date Time    Hemoglobin (repeated)  11.1 g/dL 12.0 - 15.9 05/02/21 0636       11.6 g/dL 12.0 - 15.9 05/01/21 0614       12.0 g/dL 12.0 - 15.9 04/30/21 0322       11.7 g/dL 12.0 - 15.9 04/29/21 2100       11.8 g/dL 12.0 - 15.9 04/17/21 1332       12.2 g/dL 12.0 - 15.9 03/22/21 0958    Hematocrit (repeated)  31.9 % 34.0 - 46.6 05/02/21 0636       33.0 % 34.0 - 46.6 05/01/21 0614       33.5 % 34.0 - 46.6 04/30/21 0322       33.6 % 34.0 - 46.6 04/29/21 2100       34.2 % 34.0 - 46.6 04/17/21 1332       34.7 % 34.0 - 46.6 03/22/21 0958    GCT  95 mg/dL 60 - 140 03/22/21 0958    Antibody Screen (repeated)        GTT Fasting        GTT 1 Hr        GTT 2 Hr        GTT 3 Hr        Group B Strep  Negative  Negative 05/27/21 1403          Drug Screening     Test Value Reference Range Date Time    Amphetamine Screen  Negative  Negative 04/17/21 1324       Negative  Negative 11/16/20 1347    Barbiturate Screen  Negative  Negative 04/17/21 1324       Negative  Negative 11/16/20 1347    Benzodiazepine Screen  Negative  Negative 04/17/21 1324       Negative  Negative 11/16/20 1347    Methadone Screen  Negative  Negative 04/17/21 1324       Negative  Negative 11/16/20 1347    Phencyclidine Screen  Negative  Negative 04/17/21 1324       Negative  Negative 11/16/20 1347    Opiates Screen  Negative  Negative 04/17/21 1324       Negative  Negative 11/16/20 1347    THC Screen  Negative  Negative 04/17/21 1324       Negative  Negative 11/16/20 1347    Cocaine Screen  Negative  Negative 04/17/21 1324       Negative  Negative 11/16/20  1347    Propoxyphene Screen  Negative  Negative 04/17/21 1324       Negative  Negative 11/16/20 1347    Buprenorphine Screen  Negative  Negative 04/17/21 1324       Negative  Negative 11/16/20 1347    Methamphetamine Screen  Negative  Negative 04/17/21 1324       Negative  Negative 11/16/20 1347    Oxycodone Screen  Negative  Negative 04/17/21 1324       Negative  Negative 11/16/20 1347    Tricyclic Antidepressants Screen  Negative  Negative 04/17/21 1324       Negative  Negative 11/16/20 1347          Other (Risk screening)     Test Value Reference Range Date Time    Varicella IgG  Negative  Positive, Equivocal 11/16/20 1347    Parvovirus IgG        CMV IgG        Cystic Fibrosis        Hemoglobin electrophoresis        NIPT        MSAFP-4        AFP (for NTD only)              Legend    ^: Historical                      External Prenatal Results     Pregnancy Outside Results - Transcribed From Office Records - See Scanned Records For Details     Test Value Date Time    ABO  A  11/16/20 1347    Rh  Positive  11/16/20 1347    Antibody Screen  Negative  11/16/20 1347    Varicella IgG  Negative  11/16/20 1347    Rubella  Equivocal  11/16/20 1347    Hgb  11.1 g/dL 05/02/21 0636       11.6 g/dL 05/01/21 0614       12.0 g/dL 04/30/21 0322       11.7 g/dL 04/29/21 2100       11.8 g/dL 04/17/21 1332       12.2 g/dL 03/22/21 0958       12.6 g/dL 11/16/20 1347    Hct  31.9 % 05/02/21 0636       33.0 % 05/01/21 0614       33.5 % 04/30/21 0322       33.6 % 04/29/21 2100       34.2 % 04/17/21 1332       34.7 % 03/22/21 0958       35.4 % 11/16/20 1347    Glucose Fasting GTT       Glucose Tolerance Test 1 hour       Glucose Tolerance Test 3 hour       Gonorrhea (discrete)  Negative  11/16/20 1347    Chlamydia (discrete)  Negative  11/16/20 1347    RPR  Non-Reactive  11/16/20 1347    VDRL       Syphilis Antibody       HBsAg  Non-Reactive  11/16/20 1347    Herpes Simplex Virus PCR       Herpes Simplex VIrus Culture       HIV   Non-Reactive  20 1347    Hep C RNA Quant PCR       Hep C Antibody  Non-Reactive  20 1347    AFP       Group B Strep  Negative  21 1403    GBS Susceptibility to Clindamycin       GBS Susceptibility to Erythromycin       Fetal Fibronectin       Genetic Testing, Maternal Blood             Drug Screening     Test Value Date Time    Urine Drug Screen       Amphetamine Screen  Negative  21 1324       Negative  20 1347    Barbiturate Screen  Negative  21 1324       Negative  20 1347    Benzodiazepine Screen  Negative  21 1324       Negative  20 1347    Methadone Screen  Negative  21 1324       Negative  20 1347    Phencyclidine Screen  Negative  21 1324       Negative  20 1347    Opiates Screen  Negative  21 1324       Negative  20 1347    THC Screen  Negative  21 1324       Negative  20 1347    Cocaine Screen       Propoxyphene Screen  Negative  21 1324       Negative  20 1347    Buprenorphine Screen  Negative  21 1324       Negative  20 1347    Methamphetamine Screen       Oxycodone Screen  Negative  21 1324       Negative  20 1347    Tricyclic Antidepressants Screen  Negative  21 1324       Negative  20 1347          Legend    ^: Historical                         Past OB History:     OB History    Para Term  AB Living   1 0 0 0 0 0   SAB TAB Ectopic Molar Multiple Live Births   0 0 0 0 0 0      # Outcome Date GA Lbr Gera/2nd Weight Sex Delivery Anes PTL Lv   1 Current                 ALLERGIES:   No Known Allergies     Home Medications:     Prior to Admission medications    Medication Sig Start Date End Date Taking? Authorizing Provider   ferrous sulfate 325 (65 FE) MG tablet Take 1 tablet by mouth Daily With Breakfast. 21  Yes Bhargav Bond MD   prenatal vitamin (prenatal, CLASSIC, vitamin) tablet Take 1 tablet by mouth Daily.   Yes Provider,  Amanuel, MD       Past Medical History: Past Medical History:   Diagnosis Date   • Anxiety    • Migraine    • Smoker       Past Surgical History Past Surgical History:   Procedure Laterality Date   • WISDOM TOOTH EXTRACTION        Family History: Family History   Problem Relation Age of Onset   • No Known Problems Sister    • No Known Problems Brother    • Ovarian cancer Paternal Grandmother    • No Known Problems Sister    • No Known Problems Brother    • No Known Problems Brother       Social History:  reports that she has been smoking cigarettes. She has never used smokeless tobacco.   reports previous alcohol use.   reports previous drug use.        Review of Systems                                                                                                                      Constitutional: Denies night sweats    HENT: No hearing changes, denies ear pain    Eye: No eye pain; no foreign body in eye    Pulmonary: No hemoptysis    Cardiovascular: No claudication    GI: No hematemesis    Musculoskeletal: No arthralgias, no joint swelling    Endocrine: No polydipsia or polyuria    Hematologic: Denies any free bleeding    Psychiatric: Denies any delusions      Objective     Documented Vitals    06/18/21 1138   BP: 112/78   Weight: 65 kg (143 lb 6.4 oz)          OBGyn Exam  Constitutional: Appears to be in no acute distress; Eyes: sclera normal; Endocrine system: thyroid palpate is normal; Pulmonary system: lungs clear; Cardiovascular system: heart regular rate and rhythm; Gastrointestinal system: abdomen soft nontender, active bowel sounds; Urologic system: CVA negative; Psychiatric: appropriate insight; Neurologic: gait within normal limits cervix is fingertip to 1      Last Labs  Lab Results   Component Value Date    WBC 20.75 (H) 05/02/2021    RBC 3.49 (L) 05/02/2021    HGB 11.1 (L) 05/02/2021    HCT 31.9 (L) 05/02/2021    MCV 91.4 05/02/2021    MCH 31.8 05/02/2021    MCHC 34.8 05/02/2021    RDW 13.6  2021    RDWSD 45.1 2021    MPV 12.2 (H) 2021     (L) 2021        Lab Results   Component Value Date    GLUCOSE 81 2021    BUN 5 (L) 2021    CREATININE 0.46 (L) 2021     2021    K 3.7 2021     2021    CO2 20.0 (L) 2021    CALCIUM 9.0 2021    PROTEINTOT 6.3 2021    ALBUMIN 3.90 2021    ALT 19 2021    AST 17 2021    ALKPHOS 105 2021    BILITOT 0.2 2021    EGFRIFNONA >150 2021    GLOB 2.4 2021    AGRATIO 1.6 2021    BCR 10.9 2021    ANIONGAP 10.0 2021       No results found for: HCGQUAL      Assessment/Plan:  1. 20 y.o. [id number].  Possible partial placental abruption chronic after reviewing risk benefits alternatives as she is now 39 weeks we will plan for induction of labor likely Cytotec followed by cervical balloon in induction with Pitocin risk benefits alternatives reviewed including risk of hypertonic contractions damage mother baby.  Risk of iatrogenic  section    Becky Shaffer and I have discussed pain goals for this hospitalization after reviewing her current clinical condition, medical history and prior pain experiences.  The goal is to keep her pain level 3.  To help achieve this, I plan to multimodal pain management.       This document has been electronically signed by Bhargav Bond MD on 2021 13:17 CDT\    Please note that portions of this note were completed with a voice recognition program.           Electronically signed by Bhargav Bond MD at 21 1320             Bhargav Bond MD at 21 1315            Obstetric History and Physical    Chief complaint possible partial placental abruption.        Patient is a 20 y.o. female  currently at 39w0d, who presents with concern for partial placental abruption.  Patient had had bleeding and contractions at about 34 weeks the bleeding with resolved.  Concern  for partial placental abruption.  Placental abruption had not been demonstrated ultrasonographically but it is known that the sensitivity placental abruption ultrasonographically is not 100%.  She had not followed with biophysical profiles but had not for the last couple weeks that she said she had had a death in the family and had not followed up despite the importance of follow-up pending been emphasized to her.    Her prenatal care is has been followed up through [facility] with in adequate prenatal care     Obstetric History   # 1 - Date: None, Sex: None, Weight: None, GA: None, Delivery: None, Apgar1: None, Apgar5: None, Living: None, Birth Comments: None       The following portions of the patients history were reviewed and updated as appropriate: current medications, allergies, past medical history, past surgical history, past family history, past social history and problem list .       Prenatal Information:  Prenatal Results     POC Urine Glucose/Protein     Test Value Reference Range Date Time    Urine Glucose        Urine Protein              Initial Prenatal Labs     Test Value Reference Range Date Time    Hemoglobin  12.6 g/dL 12.0 - 15.9 11/16/20 1347    Hematocrit  35.4 % 34.0 - 46.6 11/16/20 1347    Platelets  131 10*3/mm3 140 - 450 05/02/21 0636       144 10*3/mm3 140 - 450 05/01/21 0614       144 10*3/mm3 140 - 450 04/30/21 0322       135 10*3/mm3 140 - 450 04/29/21 2100       139 10*3/mm3 140 - 450 04/17/21 1332       142 10*3/mm3 140 - 450 03/22/21 0958       225 10*3/mm3 140 - 450 11/16/20 1347    Rubella IgG  Equivocal   11/16/20 1347    Hepatitis B SAg  Non-Reactive  Non-Reactive 11/16/20 1347    Hepatitis C Ab  Non-Reactive  Non-Reactive 11/16/20 1347    RPR  Non-Reactive  Non-Reactive 11/16/20 1347    ABO  A   11/16/20 1347    Rh  Positive   11/16/20 1347    Antibody Screen  Negative   11/16/20 1347    HIV  Non-Reactive  Non-Reactive 11/16/20 1347    Urine Culture  No growth    11/16/20 1347    Gonorrhea  Negative  Negative 11/16/20 1347    Chlamydia  Negative  Negative 11/16/20 1347    TSH              2nd and 3rd Trimester     Test Value Reference Range Date Time    Hemoglobin (repeated)  11.1 g/dL 12.0 - 15.9 05/02/21 0636       11.6 g/dL 12.0 - 15.9 05/01/21 0614       12.0 g/dL 12.0 - 15.9 04/30/21 0322       11.7 g/dL 12.0 - 15.9 04/29/21 2100       11.8 g/dL 12.0 - 15.9 04/17/21 1332       12.2 g/dL 12.0 - 15.9 03/22/21 0958    Hematocrit (repeated)  31.9 % 34.0 - 46.6 05/02/21 0636       33.0 % 34.0 - 46.6 05/01/21 0614       33.5 % 34.0 - 46.6 04/30/21 0322       33.6 % 34.0 - 46.6 04/29/21 2100       34.2 % 34.0 - 46.6 04/17/21 1332       34.7 % 34.0 - 46.6 03/22/21 0958    GCT  95 mg/dL 60 - 140 03/22/21 0958    Antibody Screen (repeated)        GTT Fasting        GTT 1 Hr        GTT 2 Hr        GTT 3 Hr        Group B Strep  Negative  Negative 05/27/21 1403          Drug Screening     Test Value Reference Range Date Time    Amphetamine Screen  Negative  Negative 04/17/21 1324       Negative  Negative 11/16/20 1347    Barbiturate Screen  Negative  Negative 04/17/21 1324       Negative  Negative 11/16/20 1347    Benzodiazepine Screen  Negative  Negative 04/17/21 1324       Negative  Negative 11/16/20 1347    Methadone Screen  Negative  Negative 04/17/21 1324       Negative  Negative 11/16/20 1347    Phencyclidine Screen  Negative  Negative 04/17/21 1324       Negative  Negative 11/16/20 1347    Opiates Screen  Negative  Negative 04/17/21 1324       Negative  Negative 11/16/20 1347    THC Screen  Negative  Negative 04/17/21 1324       Negative  Negative 11/16/20 1347    Cocaine Screen  Negative  Negative 04/17/21 1324       Negative  Negative 11/16/20 1347    Propoxyphene Screen  Negative  Negative 04/17/21 1324       Negative  Negative 11/16/20 1347    Buprenorphine Screen  Negative  Negative 04/17/21 1324       Negative  Negative 11/16/20 1347    Methamphetamine Screen   Negative  Negative 04/17/21 1324       Negative  Negative 11/16/20 1347    Oxycodone Screen  Negative  Negative 04/17/21 1324       Negative  Negative 11/16/20 1347    Tricyclic Antidepressants Screen  Negative  Negative 04/17/21 1324       Negative  Negative 11/16/20 1347          Other (Risk screening)     Test Value Reference Range Date Time    Varicella IgG  Negative  Positive, Equivocal 11/16/20 1347    Parvovirus IgG        CMV IgG        Cystic Fibrosis        Hemoglobin electrophoresis        NIPT        MSAFP-4        AFP (for NTD only)              Legend    ^: Historical                      External Prenatal Results     Pregnancy Outside Results - Transcribed From Office Records - See Scanned Records For Details     Test Value Date Time    ABO  A  11/16/20 1347    Rh  Positive  11/16/20 1347    Antibody Screen  Negative  11/16/20 1347    Varicella IgG  Negative  11/16/20 1347    Rubella  Equivocal  11/16/20 1347    Hgb  11.1 g/dL 05/02/21 0636       11.6 g/dL 05/01/21 0614       12.0 g/dL 04/30/21 0322       11.7 g/dL 04/29/21 2100       11.8 g/dL 04/17/21 1332       12.2 g/dL 03/22/21 0958       12.6 g/dL 11/16/20 1347    Hct  31.9 % 05/02/21 0636       33.0 % 05/01/21 0614       33.5 % 04/30/21 0322       33.6 % 04/29/21 2100       34.2 % 04/17/21 1332       34.7 % 03/22/21 0958       35.4 % 11/16/20 1347    Glucose Fasting GTT       Glucose Tolerance Test 1 hour       Glucose Tolerance Test 3 hour       Gonorrhea (discrete)  Negative  11/16/20 1347    Chlamydia (discrete)  Negative  11/16/20 1347    RPR  Non-Reactive  11/16/20 1347    VDRL       Syphilis Antibody       HBsAg  Non-Reactive  11/16/20 1347    Herpes Simplex Virus PCR       Herpes Simplex VIrus Culture       HIV  Non-Reactive  11/16/20 1347    Hep C RNA Quant PCR       Hep C Antibody  Non-Reactive  11/16/20 1347    AFP       Group B Strep  Negative  05/27/21 1403    GBS Susceptibility to Clindamycin       GBS Susceptibility to  Erythromycin       Fetal Fibronectin       Genetic Testing, Maternal Blood             Drug Screening     Test Value Date Time    Urine Drug Screen       Amphetamine Screen  Negative  21 1324       Negative  20 1347    Barbiturate Screen  Negative  21 1324       Negative  20 1347    Benzodiazepine Screen  Negative  21 1324       Negative  20 1347    Methadone Screen  Negative  21 1324       Negative  20 1347    Phencyclidine Screen  Negative  21 1324       Negative  20 1347    Opiates Screen  Negative  21 1324       Negative  20 1347    THC Screen  Negative  21 1324       Negative  20 1347    Cocaine Screen       Propoxyphene Screen  Negative  21 1324       Negative  20 1347    Buprenorphine Screen  Negative  21 1324       Negative  20 1347    Methamphetamine Screen       Oxycodone Screen  Negative  21 1324       Negative  20 1347    Tricyclic Antidepressants Screen  Negative  21 1324       Negative  20 1347          Legend    ^: Historical                         Past OB History:     OB History    Para Term  AB Living   1 0 0 0 0 0   SAB TAB Ectopic Molar Multiple Live Births   0 0 0 0 0 0      # Outcome Date GA Lbr Gera/2nd Weight Sex Delivery Anes PTL Lv   1 Current                 ALLERGIES:   No Known Allergies     Home Medications:     Prior to Admission medications    Medication Sig Start Date End Date Taking? Authorizing Provider   ferrous sulfate 325 (65 FE) MG tablet Take 1 tablet by mouth Daily With Breakfast. 21  Yes Bhargav Bond MD   prenatal vitamin (prenatal, CLASSIC, vitamin) tablet Take 1 tablet by mouth Daily.   Yes ProviderAmanuel MD       Past Medical History: Past Medical History:   Diagnosis Date   • Anxiety    • Migraine    • Smoker       Past Surgical History Past Surgical History:   Procedure Laterality Date   • WISDOM TOOTH  EXTRACTION        Family History: Family History   Problem Relation Age of Onset   • No Known Problems Sister    • No Known Problems Brother    • Ovarian cancer Paternal Grandmother    • No Known Problems Sister    • No Known Problems Brother    • No Known Problems Brother       Social History:  reports that she has been smoking cigarettes. She has never used smokeless tobacco.   reports previous alcohol use.   reports previous drug use.        Review of Systems                                                                                                                      Constitutional: Denies night sweats    HENT: No hearing changes, denies ear pain    Eye: No eye pain; no foreign body in eye    Pulmonary: No hemoptysis    Cardiovascular: No claudication    GI: No hematemesis    Musculoskeletal: No arthralgias, no joint swelling    Endocrine: No polydipsia or polyuria    Hematologic: Denies any free bleeding    Psychiatric: Denies any delusions      Objective     Documented Vitals    06/18/21 1138   BP: 112/78   Weight: 65 kg (143 lb 6.4 oz)          OBGyn Exam  Constitutional: Appears to be in no acute distress; Eyes: sclera normal; Endocrine system: thyroid palpate is normal; Pulmonary system: lungs clear; Cardiovascular system: heart regular rate and rhythm; Gastrointestinal system: abdomen soft nontender, active bowel sounds; Urologic system: CVA negative; Psychiatric: appropriate insight; Neurologic: gait within normal limits cervix is fingertip to 1      Last Labs  Lab Results   Component Value Date    WBC 20.75 (H) 05/02/2021    RBC 3.49 (L) 05/02/2021    HGB 11.1 (L) 05/02/2021    HCT 31.9 (L) 05/02/2021    MCV 91.4 05/02/2021    MCH 31.8 05/02/2021    MCHC 34.8 05/02/2021    RDW 13.6 05/02/2021    RDWSD 45.1 05/02/2021    MPV 12.2 (H) 05/02/2021     (L) 05/02/2021        Lab Results   Component Value Date    GLUCOSE 81 04/29/2021    BUN 5 (L) 04/29/2021    CREATININE 0.46 (L) 04/29/2021    NA  137 2021    K 3.7 2021     2021    CO2 20.0 (L) 2021    CALCIUM 9.0 2021    PROTEINTOT 6.3 2021    ALBUMIN 3.90 2021    ALT 19 2021    AST 17 2021    ALKPHOS 105 2021    BILITOT 0.2 2021    EGFRIFNONA >150 2021    GLOB 2.4 2021    AGRATIO 1.6 2021    BCR 10.9 2021    ANIONGAP 10.0 2021       No results found for: HCGQUAL      Assessment/Plan:  2. 20 y.o. [id number].  Possible partial placental abruption chronic after reviewing risk benefits alternatives as she is now 39 weeks we will plan for induction of labor likely Cytotec followed by cervical balloon in induction with Pitocin risk benefits alternatives reviewed including risk of hypertonic contractions damage mother baby.  Risk of iatrogenic  section    Becky Shaffer and I have discussed pain goals for this hospitalization after reviewing her current clinical condition, medical history and prior pain experiences.  The goal is to keep her pain level 3.  To help achieve this, I plan to multimodal pain management.       This document has been electronically signed by Bhargav Bond MD on 2021 13:17 CDT\    Please note that portions of this note were completed with a voice recognition program.           Electronically signed by Bhargav Bond MD at 21 1320          Operative/Procedure Notes (last 48 hours) (Notes from 21 1521 through 21 1521)      Bhargav Bond MD at 21 2030           Section Procedure Note    Becky Shaffer    2022     Indications: abruptio placenta and non-reassuring fetal status    Preoperative Diagnosis: Intrauterine pregnancy at 39-1/7 weeks; possible partial placental abruption based on heavy bleeding about 34 weeks with very regular uterine contractions; refractory abnormal fetal heart rate tracing category 2; arrest of active phase of labor    Postoperative  Diagnosis: Same and in addition surgical findings consistent with partial old placental abruption(senna sent to pathology with the understanding that is likely not to show abnormality because sensitivity of pathology for abruption is only about 50%); cephalic pelvic disproportion    PROCEDURES PERFORMED: Procedure(s):   SECTION PRIMARY, with low transverse uterine incision    SURGEON: Bhargav Bond MD, FACOG    ASSISTANT: Tea Rodriguez CSA was responsible for performing the following activities: Retraction, Suction, Irrigation, Suturing, Closing and Placing Dressing and their skilled assistance was necessary for the success of this case.      STAFF:   Circulator: Tessy Omalley RN  Scrub Person: Viviana Garay RN; Yulisa Del Valle  L & D Nurse: Jet Krueger RN  NICU Nurse: Veda Hand RN  Assistant: Tea Rodriguez CSA  Other: Sussy Almeida RN    ANESTHESIA: Spinal    ANESTHESIA STAFF:  CRNA: Luís Alfred CRNA    Findings: Single infant in vertex presentation with large amount of molding consistent with cephalic pelvic disproportion; behind the placenta there were staining of old blood consistent with prior blood abruption.  The tubes and ovaries appeared normal preoperatively    Birth Information  YOB: 2022   Time of birth: 9:32 PM   Delivering clinician:     Sex: male   Delivery type:     Breech type (if applicable):     Observed anomalies/comments:         Estimated Blood Loss:  700           Drains: Youssef                 Specimens: Placenta               Complications:  None; patient tolerated the procedure well.           Disposition: Mother Baby Unit           Condition: stable    Procedure Details   After satisfactory regional anesthesia had been obtained patient was prepped and draped in supine position with left uterine displacement.  The anesthetic was tested and found to be adequate.  A Pfannenstiel incision was made and carried down the fascia.  The fascia was  incised laterally and superiorly on both sides. Fascia was dissected off the linea alba, cephalad and then caudally.  Rectus muscles were divided with blunt dissection.  Peritoneum grasped between 2 hemostats and incised.  A bladder flap created from the visceral peritoneum.  Uterus incised in low transverse manner and incision extended with 's fingers in a low transverse manner using cephalad and caudal traction.  Single infant delivered from vertex presentation without difficulty.  Cord blood was obtained for usual studies.  Placenta delivered spontaneously appeared intact.  Uterus extra-abdominalized, checked for retained products of conception, none found.  The hysterotomy incision closed with 0 chromic with good approximation and hemostasis.  Next an imbricating layer of 0 chromic.  Bleeding points made hemostatic with 2-0 chromic.  Sponge, instrument, needle count correct.  The peritoneum closed 2-0 Vicryl running fashion.  Meticulous subfascial hemostasis obtained.  Sponge, instrument, needle count correct.  Fascia closed with 0 Vicryl in a running fashion.  Good approximation visually and to palpation and no evidence of leakage.  Meticulous subcutaneous hemostasis obtained.  Sponge, instrument, needle count correct.  Elvis's and fatty tissue closed with 0 plain.  Skin closed 4-0 Monocryl subcutaneous.  Mastisol Steri-Strips applied.  Patient taken recovery room in good condition.            This document has been electronically signed by Bhargav Bond MD on 2021 22:13 CDT    Electronically signed by Bhargav Bond MD at 21 0857          Physician Progress Notes (last 48 hours) (Notes from 21 1521 through 21 1521)      Bhargav Bond MD at 21 0925          [facility]  Becky Meg Shaffer  : 2000  MRN: [id number]  CSN: [id number]    Post-operative Day #1/2  Subjective   Her pain is well controlled.  Vaginal bleeding is appropriate amount.  She  is passing gas and has not had a bowel movement.    Objective     Min/max vitals past 24 hours:   Temp  Min: 97.8 °F (36.6 °C)  Max: 98.8 °F (37.1 °C)  BP  Min: 0/0  Max: 157/79  Pulse  Min: 62  Max: 115  Pulse  Min: 62  Max: 115        General: well developed; well nourished  no acute distress   Abdomen: soft, non-tender; no masses   Pelvic: Not performed   Ext: Calves NT     Lab Results   Component Value Date    WBC 18.48 (H) 2021    HGB 9.9 (L) 2021    HCT 29.4 (L) 2021    MCV 91.0 2021     (L) 2021    RH Positive 2021    HEPBSAG Non-Reactive 2020       Assessment   1. POD #1/2 S/P  delivery     Plan   1. Continue routine postpartum care          This document has been electronically signed by Bhargav Bond MD on 2021 09:25 CDT          Electronically signed by Bhargav Bond MD at 21 09     Bhargav Bond MD at 21        Patient developed periodic changes with late component has not responded to intrauterine resuscitation.  Also mild to moderate variability.  She will category 2 tracing approaching category 3.  In light of this have discussed options including  section patient and family wish to proceed with  section after reviewing risk benefits alternative.  Risk of  section both long-term and short-term reviewed. Both the short-term and long-term risks of  section are reviewed at length.  Short-term risks of bleeding, infection, problems with wound healing, damage to bowel, bladder or ureter.  Small, but real risk of maternal mortality is reviewed and understood.    Long-term risks include in future pregnancies accreta abruption, uterine rupture and stillbirth, among others might be ameliorated by a .  The patient and her family have been given opportunity to ask questions and these questions have been answered to their satisfaction.    Electronically signed by Bhargav Bond MD at  06/19/21 2054     Bhargav Bond MD at 06/19/21 1937        5 cm, 90%, -1 AROM clear fetal heart rate strip reviewed category 1    Electronically signed by Bhargav Bond MD at 06/19/21 1938

## 2021-06-20 NOTE — ANESTHESIA POSTPROCEDURE EVALUATION
Patient: Becky Shaffer    Procedure Summary     Date: 21 Room / Location: [facility] LABOR DELIVERY  [facility] LABOR DELIVERY    Anesthesia Start:  Anesthesia Stop:     Procedure:  SECTION PRIMARY (N/A Abdomen) Diagnosis:     Surgeons: Bhargav Bond MD Provider: Luís Alfred CRNA    Anesthesia Type: epidural ASA Status: 2          Anesthesia Type: epidural    Vitals  Vitals Value Taken Time   /84 21   Temp     Pulse 115 21   Resp     SpO2 99 % 21   Vitals shown include unvalidated device data.        Post Anesthesia Care and Evaluation    Patient location during evaluation: bedside  Patient participation: complete - patient participated  Level of consciousness: awake and alert  Pain score: 2  Pain management: adequate  Airway patency: patent  Anesthetic complications: No anesthetic complications  PONV Status: none  Cardiovascular status: acceptable  Respiratory status: acceptable  Hydration status: acceptable  Post Neuraxial Block status: No signs or symptoms of PDPH and Legs remain weak but mobile  Comments: --------------------            21     --------------------   BP:       123/85     Pulse:      97       Resp:    22            Temp:                SpO2:      99         --------------------

## 2021-06-20 NOTE — PLAN OF CARE
Problem: Adult Inpatient Plan of Care  Goal: Plan of Care Review  Outcome: Ongoing, Progressing  Flowsheets  Taken 6/20/2021 0102 by Tessy Omalley RN  Progress: no change  Outcome Summary: VSS, csection delivery at 2132, incision CDI, voiding via cath with clear urine, PCA Morphine pump for pain, pt moving in bed well. Care transfered to [facility].  Taken 6/19/2021 1832 by Raya Chan RN  Plan of Care Reviewed With:   patient   spouse   mother   Goal Outcome Evaluation:           Progress: no change  Outcome Summary: VSS, csection delivery at 2132, incision CDI, voiding via cath with clear urine, PCA Morphine pump for pain, pt moving in bed well. Care transfered to [facility].

## 2021-06-20 NOTE — OP NOTE
Section Procedure Note    Becky Shaffer    2022     Indications: abruptio placenta and non-reassuring fetal status    Preoperative Diagnosis: Intrauterine pregnancy at 39-1/7 weeks; possible partial placental abruption based on heavy bleeding about 34 weeks with very regular uterine contractions; refractory abnormal fetal heart rate tracing category 2; arrest of active phase of labor    Postoperative Diagnosis: Same and in addition surgical findings consistent with partial old placental abruption(senna sent to pathology with the understanding that is likely not to show abnormality because sensitivity of pathology for abruption is only about 50%); cephalic pelvic disproportion    PROCEDURES PERFORMED: Procedure(s):   SECTION PRIMARY, with low transverse uterine incision    SURGEON: Bhargav Bond MD, FACOG    ASSISTANT: Tea Rodriguez CSA was responsible for performing the following activities: Retraction, Suction, Irrigation, Suturing, Closing and Placing Dressing and their skilled assistance was necessary for the success of this case.      STAFF:   Circulator: Tessy Omalley RN  Scrub Person: Viviana Garay RN; Yulisa Del Valle  L & D Nurse: Jet Krueger RN  NICU Nurse: Veda Hand RN  Assistant: Tea Rodriguez CSA  Other: Sussy Almeida RN    ANESTHESIA: Spinal    ANESTHESIA STAFF:  CRNA: Luís Alfred CRNA    Findings: Single infant in vertex presentation with large amount of molding consistent with cephalic pelvic disproportion; behind the placenta there were staining of old blood consistent with prior blood abruption.  The tubes and ovaries appeared normal preoperatively    Birth Information  YOB: 2022   Time of birth: 9:32 PM   Delivering clinician:     Sex: male   Delivery type:     Breech type (if applicable):     Observed anomalies/comments:         Estimated Blood Loss:  700           Drains: Youssef                 Specimens: Placenta                Complications:  None; patient tolerated the procedure well.           Disposition: Mother Baby Unit           Condition: stable    Procedure Details   After satisfactory regional anesthesia had been obtained patient was prepped and draped in supine position with left uterine displacement.  The anesthetic was tested and found to be adequate.  A Pfannenstiel incision was made and carried down the fascia.  The fascia was incised laterally and superiorly on both sides. Fascia was dissected off the linea alba, cephalad and then caudally.  Rectus muscles were divided with blunt dissection.  Peritoneum grasped between 2 hemostats and incised.  A bladder flap created from the visceral peritoneum.  Uterus incised in low transverse manner and incision extended with 's fingers in a low transverse manner using cephalad and caudal traction.  Single infant delivered from vertex presentation without difficulty.  Cord blood was obtained for usual studies.  Placenta delivered spontaneously appeared intact.  Uterus extra-abdominalized, checked for retained products of conception, none found.  The hysterotomy incision closed with 0 chromic with good approximation and hemostasis.  Next an imbricating layer of 0 chromic.  Bleeding points made hemostatic with 2-0 chromic.  Sponge, instrument, needle count correct.  The peritoneum closed 2-0 Vicryl running fashion.  Meticulous subfascial hemostasis obtained.  Sponge, instrument, needle count correct.  Fascia closed with 0 Vicryl in a running fashion.  Good approximation visually and to palpation and no evidence of leakage.  Meticulous subcutaneous hemostasis obtained.  Sponge, instrument, needle count correct.  Elvis's and fatty tissue closed with 0 plain.  Skin closed 4-0 Monocryl subcutaneous.  Mastisol Steri-Strips applied.  Patient taken recovery room in good condition.            This document has been electronically signed by Bhargav Bond MD on June 19, 2021 22:13  CDT

## 2021-06-20 NOTE — PLAN OF CARE
Problem: Adult Inpatient Plan of Care  Goal: Plan of Care Review  6/20/2021 0617 by Tessy Omalley RN  Outcome: Ongoing, Progressing  Flowsheets (Taken 6/20/2021 0617)  Progress: improving  Plan of Care Reviewed With: patient  Outcome Summary: VSS, fundus firm, lochia scant, moving in bed well, urine output adequate clear urine. PCA morphine controlling pain.   Goal Outcome Evaluation:  Plan of Care Reviewed With: patient        Progress: improving  Outcome Summary: VSS, fundus firm, lochia scant, moving in bed well, urine output adequate clear urine. PCA morphine controlling pain.

## 2021-06-20 NOTE — PROGRESS NOTES
[facility]  Becky Shaffer  : 2000  MRN: [id number]  CSN: [id number]    Post-operative Day #1/2  Subjective   Her pain is well controlled.  Vaginal bleeding is appropriate amount.  She is passing gas and has not had a bowel movement.     Objective     Min/max vitals past 24 hours:   Temp  Min: 97.8 °F (36.6 °C)  Max: 98.8 °F (37.1 °C)  BP  Min: 0/0  Max: 157/79  Pulse  Min: 62  Max: 115  Pulse  Min: 62  Max: 115        General: well developed; well nourished  no acute distress   Abdomen: soft, non-tender; no masses   Pelvic: Not performed   Ext: Calves NT     Lab Results   Component Value Date    WBC 18.48 (H) 2021    HGB 9.9 (L) 2021    HCT 29.4 (L) 2021    MCV 91.0 2021     (L) 2021    RH Positive 2021    HEPBSAG Non-Reactive 2020        Assessment   1. POD #1/2 S/P  delivery      Plan   1. Continue routine postpartum care          This document has been electronically signed by Bhargav Bond MD on 2021 09:25 CDT

## 2021-06-20 NOTE — PROGRESS NOTES
Patient developed periodic changes with late component has not responded to intrauterine resuscitation.  Also mild to moderate variability.  She will category 2 tracing approaching category 3.  In light of this have discussed options including  section patient and family wish to proceed with  section after reviewing risk benefits alternative.  Risk of  section both long-term and short-term reviewed. Both the short-term and long-term risks of  section are reviewed at length.  Short-term risks of bleeding, infection, problems with wound healing, damage to bowel, bladder or ureter.  Small, but real risk of maternal mortality is reviewed and understood.    Long-term risks include in future pregnancies accreta abruption, uterine rupture and stillbirth, among others might be ameliorated by a .  The patient and her family have been given opportunity to ask questions and these questions have been answered to their satisfaction.

## 2021-06-21 VITALS
BODY MASS INDEX: 27.93 KG/M2 | WEIGHT: 143 LBS | RESPIRATION RATE: 18 BRPM | HEART RATE: 75 BPM | TEMPERATURE: 97.7 F | SYSTOLIC BLOOD PRESSURE: 112 MMHG | DIASTOLIC BLOOD PRESSURE: 65 MMHG | OXYGEN SATURATION: 99 %

## 2021-06-21 PROCEDURE — 99238 HOSP IP/OBS DSCHRG MGMT 30/<: CPT | Performed by: OBSTETRICS & GYNECOLOGY

## 2021-06-21 PROCEDURE — 25010000002 KETOROLAC TROMETHAMINE PER 15 MG: Performed by: OBSTETRICS & GYNECOLOGY

## 2021-06-21 RX ORDER — IBUPROFEN 800 MG/1
800 TABLET ORAL EVERY 8 HOURS SCHEDULED
Qty: 42 TABLET | Refills: 2 | Status: SHIPPED | OUTPATIENT
Start: 2021-06-21 | End: 2021-07-05

## 2021-06-21 RX ORDER — OXYCODONE HYDROCHLORIDE 10 MG/1
TABLET ORAL
Qty: 18 TABLET | Refills: 0 | Status: SHIPPED | OUTPATIENT
Start: 2021-06-21 | End: 2021-06-24

## 2021-06-21 RX ORDER — PROMETHAZINE HYDROCHLORIDE 12.5 MG/1
12.5 TABLET ORAL EVERY 6 HOURS PRN
Qty: 30 TABLET | Refills: 2 | Status: SHIPPED | OUTPATIENT
Start: 2021-06-21 | End: 2022-01-11

## 2021-06-21 RX ADMIN — OXYCODONE 5 MG: 5 TABLET ORAL at 07:18

## 2021-06-21 RX ADMIN — OXYCODONE 5 MG: 5 TABLET ORAL at 11:32

## 2021-06-21 RX ADMIN — OXYCODONE 5 MG: 5 TABLET ORAL at 02:41

## 2021-06-21 RX ADMIN — PRENATAL VIT W/ FE FUMARATE-FA TAB 27-0.8 MG 1 TABLET: 27-0.8 TAB at 08:21

## 2021-06-21 RX ADMIN — KETOROLAC TROMETHAMINE 30 MG: 30 INJECTION, SOLUTION INTRAMUSCULAR; INTRAVENOUS at 02:42

## 2021-06-21 RX ADMIN — IBUPROFEN 800 MG: 800 TABLET, FILM COATED ORAL at 08:21

## 2021-06-21 NOTE — PLAN OF CARE
Problem: Adult Inpatient Plan of Care  Goal: Plan of Care Review  Outcome: Ongoing, Progressing  Flowsheets  Taken 6/21/2021 0446 by Tessy Omalley, RN  Plan of Care Reviewed With: patient  Outcome Summary: VSS, Fundus firm, lochia scant, tolerating PO intake. Pain medications adjusted from every 6 hours to every 4 hours. Voids and passing flatus. Incision CDI. Bonding well with infant.  Taken 6/20/2021 1643 by Raya Chan RN  Progress: improving   Goal Outcome Evaluation:  Plan of Care Reviewed With: patient        Progress: improving  Outcome Summary: VSS, Fundus firm, lochia scant, tolerating PO intake. Pain medications adjusted from every 6 hours to every 4 hours. Voids and passing flatus. Incision CDI. Bonding well with infant.

## 2021-06-21 NOTE — DISCHARGE SUMMARY
[facility]  Becky Shaffer  : 2000  MRN: [id number]  CSN: [id number]    Discharge Summary      Date of Admission: 2021   Date of Discharge: 2021   Principle Discharge Dx: : Intrauterine pregnancy at 39-1/7 weeks; possible partial placental abruption based on heavy bleeding about 34 weeks with very regular uterine contractions; refractory abnormal fetal heart rate tracing category 2; arrest of active phase of labor,surgical findings consistent with partial old placental abruption(senna sent to pathology with the understanding that is likely not to show abnormality because sensitivity of pathology for abruption is only about 50%); cephalic pelvic disproportion   Procedures Performed: Procedure(s):   SECTION PRIMARY with low transverse uterine incision   Brief History: Patient is a 20 y.o. now  who presented to labor and delivery for induction of labor.  She had had a complicated  course.  She had had heavy vaginal bleeding at about 34 weeks with active contractions very suggestive of partial placental abruption this eventually resolved.  She was followed with weekly fetal wellbeing testing with plan for probable early delivery.  It had been impressed on the patient the importance of regular prenatal care however because of a death in the family patient 1 was lost to follow-up at about 37 weeks she came in at about 39 weeks and then after reviewing risk benefits alternatives was admitted for induction of labor.  She arrested at 5 cm and developed abnormal fetal heart rate tracing that was refractory initially to resuscitation after reviewing risk benefits alternatives decision for  section..   Hospital Course: Her post-operative course was unremarkable.  On POD # 2 she expressed the desire for D/C. She had no febrile morbidity. She had passed gas, and was urinating normally. She was eating a regular diet without difficulty. She was ambulating well.  Her  incision appeared to be healing well.        Condition:  Discharge Activity:  Discharge Diet: Stable  Activity Instructions     Pelvic Rest          Regular   Discharge Medications:    Your medication list      START taking these medications      Instructions Last Dose Given Next Dose Due   ibuprofen 800 MG tablet  Commonly known as: ADVIL,MOTRIN      Take 1 tablet by mouth Every 8 (Eight) Hours for 14 days.       oxyCODONE 10 MG tablet  Commonly known as: ROXICODONE      1/2 -1 2 every 4-6 hours as needed       promethazine 12.5 MG tablet  Commonly known as: PHENERGAN      Take 1 tablet by mouth Every 6 (Six) Hours As Needed for Nausea or Vomiting.          CONTINUE taking these medications      Instructions Last Dose Given Next Dose Due   ferrous sulfate 325 (65 FE) MG tablet      Take 1 tablet by mouth Daily With Breakfast.       prenatal (CLASSIC) vitamin  tablet  Generic drug: prenatal vitamin      Take 1 tablet by mouth Daily.          STOP taking these medications    doxylamine 25 MG tablet  Commonly known as: UNISOM              Where to Get Your Medications      These medications were sent to [facility] - [place] [id number]    Hours: Monday through Friday 7:00am to 5:00pm Phone: [phone] ·   ibuprofen 800 MG tablet  · oxyCODONE 10 MG tablet  · promethazine 12.5 MG tablet        Discharge Disposition: home   Follow-up: No future appointments.         This document has been electronically signed by Bhargav Bond MD on June 21, 2021 07:47 CDT

## 2021-06-21 NOTE — PROGRESS NOTES
POSTPARTUM PROGRESS NOTE  NAME: Becky Shaffer  : 2000  MRN: [id number]      LOS: 2 days     Subjective:     Interval History:  Pain well controlled with medications if taken every 4 hours, + Flatus, no BM yet, lochia minimal.  Desires depo shot for birth control. Pt is bottle feeding. Pt has returned to normal diet and is ambulating well. Pt is voiding well.     Review of Systems:   GEN: No fever/chills, no dizziness or lightheadedness  CVS: No chest pain, no palpitations  RESP: No shortness of breath  GI: No nausea or vomiting    Objective:     Vital Signs  Temp:  [97.5 °F (36.4 °C)-99.5 °F (37.5 °C)] 97.7 °F (36.5 °C)  Heart Rate:  [85-95] 89  Resp:  [18] 18  BP: ()/(62-75) 123/62    Physical Exam  GEN: A&O x3, NAD  CVS: RRR, S1/S2, no m/g/r  LUNGS: CTAB, no wheezes, no rhonchi  ABD: Soft, Nontender  Fundus: firm below umbilicus  EXT: no edema, no calf tenderness bilaterally.    Medication Review    Current Facility-Administered Medications:   •  carboprost (HEMABATE) injection 250 mcg, 250 mcg, Intramuscular, PRN, Bhargav Bond MD  •  dextrose 5 % and lactated Ringer's infusion, 125 mL/hr, Intravenous, Continuous, Bhargav Bond MD, Stopped at 21 8914  •  diphenhydrAMINE (BENADRYL) capsule 25 mg, 25 mg, Oral, Q4H PRN **OR** diphenhydrAMINE (BENADRYL) injection 25 mg, 25 mg, Intravenous, Q4H PRN **OR** diphenhydrAMINE (BENADRYL) injection 25 mg, 25 mg, Intramuscular, Q4H PRN, Luís Alfred CRNA  •  Hydrocortisone (Perianal) (ANUSOL-HC) 2.5 % rectal cream, , Rectal, TID PRN, Bhargav Bond MD  •  ibuprofen (ADVIL,MOTRIN) tablet 800 mg, 800 mg, Oral, Q8H, Bhargav Bond MD  •  lactated ringers bolus 1,000 mL, 1,000 mL, Intravenous, Once, Bhargav Bond MD  •  lanolin ointment, , Topical, Q1H PRN, Bhargav Bond MD  •  lidocaine PF 1% (XYLOCAINE) injection 5 mL, 5 mL, Intradermal, PRN, Bhargav Bond MD  •  methylergonovine (METHERGINE) injection 200 mcg, 200 mcg,  Intramuscular, Once PRN, Bhargav Bond MD  •  miSOPROStol (CYTOTEC) tablet 800 mcg, 800 mcg, Rectal, PRN, Bhargav Bond MD  •  naloxone (NARCAN) injection 0.2 mg, 0.2 mg, Intravenous, Q15 Min PRN, Luís Alfred CRNA  •  ondansetron (ZOFRAN) injection 4 mg, 4 mg, Intravenous, Once PRN, Luís Alfred CRNA, 4 mg at 21 1046  •  oxyCODONE (ROXICODONE) immediate release tablet 10 mg, 10 mg, Oral, Q6H PRN, Bhargav Bond MD, 10 mg at 21 0924  •  oxyCODONE (ROXICODONE) immediate release tablet 5 mg, 5 mg, Oral, Q4H PRN, Bhargav Bond MD, 5 mg at 21 0241  •  prenatal vitamin tablet 1 tablet, 1 tablet, Oral, Daily, Bhargav Bond MD, 1 tablet at 21 0806  •  promethazine (PHENERGAN) suppository 12.5 mg, 12.5 mg, Rectal, Q6H PRN **OR** promethazine (PHENERGAN) tablet 12.5 mg, 12.5 mg, Oral, Q6H PRN, Bhargav Bond MD  •  simethicone (MYLICON) chewable tablet 80 mg, 80 mg, Oral, 4x Daily PRN, Bhargav Bond MD  •  sodium chloride 0.9 % flush 3 mL, 3 mL, Intravenous, Q12H, Bhargav Bond MD     Diagnostic Data    Lab Results (last 24 hours)     ** No results found for the last 24 hours. **          I reviewed the patient's new clinical results.    Assessment/Plan:     Becky Shaffer 20 y.o.  at 39w0d s/p c section due to no reassuring fetal status with category 2 tracing after possible placental abruption at 34 weeks.   1. Encourage ambulation  2. Continue routine postpartum care.  3. Consider for discharge     Plan for disposition: Discharge home on   I have seen and evaluated the patient.  I have discussed the case with the resident. I have reviewed the notes, assessment and plan, and/or procedures performed by the resident. I concur with the resident’s documentation.        This document has been electronically signed by Bhargav Bond MD on 2021 07:37 CDT

## 2021-06-22 NOTE — PAYOR COMM NOTE
"Loretta Enamorado   [facility]  phone [phone]  fax [phone]    Auth# [id number]    Gabe Salmeronquesuzy Mann (20 y.o. Female)     Date of Birth Social Security Number Address Home Phone MRN    2000  [address] [facility] [place] [address] [phone] [id number]    Shinto Marital Status          None Single       Admission Date Admission Type Admitting Provider Attending Provider Department, Room/Bed    21 Elective hBargav Bond MD  [facility]AB[facility], M766/1    Discharge Date Discharge Disposition Discharge Destination        2021 Home or Self Care              Attending Provider: (none)   Allergies: No Known Allergies    Isolation: None   Infection: None   Code Status: Prior    Ht: 152.4 cm (60\")   Wt: 64.9 kg (143 lb)    Admission Cmt: None   Principal Problem: Antepartum placental abruption, third trimester [O45.93]                 Active Insurance as of 2021     Primary Coverage     Payor Plan Insurance Group Employer/Plan Group    [employer] [place] [employer] [place]      Payor Plan Address Payor Plan Phone Number Payor Plan Fax Number Effective Dates    [address] [address] [phone]  2020 - None Entered    [facility] [id number]       Subscriber Name Subscriber Birth Date Member ID       FIORELLA SALMERON 2000 [id number]                 Emergency Contacts      (Rel.) Home Phone Work Phone Mobile Phone    victor manuel hernandez (Friend) -- -- [phone]               Discharge Summary      Bhargav Bond MD at 21 0747          [facility]  Fiorellacatarina Mann Edmund  : 2000  MRN: [id number]  CSN: [id number]    Discharge Summary      Date of Admission: 2021   Date of Discharge: 2021   Principle Discharge Dx: : Intrauterine pregnancy at 39-1/7 weeks; possible partial placental abruption based on heavy bleeding about 34 weeks with very regular uterine contractions; refractory abnormal fetal heart " rate tracing category 2; arrest of active phase of labor,surgical findings consistent with partial old placental abruption(senna sent to pathology with the understanding that is likely not to show abnormality because sensitivity of pathology for abruption is only about 50%); cephalic pelvic disproportion   Procedures Performed: Procedure(s):   SECTION PRIMARY with low transverse uterine incision   Brief History: Patient is a 20 y.o. now  who presented to labor and delivery for induction of labor.  She had had a complicated  course.  She had had heavy vaginal bleeding at about 34 weeks with active contractions very suggestive of partial placental abruption this eventually resolved.  She was followed with weekly fetal wellbeing testing with plan for probable early delivery.  It had been impressed on the patient the importance of regular prenatal care however because of a death in the family patient 1 was lost to follow-up at about 37 weeks she came in at about 39 weeks and then after reviewing risk benefits alternatives was admitted for induction of labor.  She arrested at 5 cm and developed abnormal fetal heart rate tracing that was refractory initially to resuscitation after reviewing risk benefits alternatives decision for  section..   Hospital Course: Her post-operative course was unremarkable.  On POD # 2 she expressed the desire for D/C. She had no febrile morbidity. She had passed gas, and was urinating normally. She was eating a regular diet without difficulty. She was ambulating well.  Her incision appeared to be healing well.        Condition:  Discharge Activity:  Discharge Diet: Stable  Activity Instructions     Pelvic Rest          Regular   Discharge Medications:    Your medication list      START taking these medications      Instructions Last Dose Given Next Dose Due   ibuprofen 800 MG tablet  Commonly known as: ADVIL,MOTRIN      Take 1 tablet by mouth Every 8 (Eight)  Hours for 14 days.       oxyCODONE 10 MG tablet  Commonly known as: ROXICODONE      1/2 -1 2 every 4-6 hours as needed       promethazine 12.5 MG tablet  Commonly known as: PHENERGAN      Take 1 tablet by mouth Every 6 (Six) Hours As Needed for Nausea or Vomiting.          CONTINUE taking these medications      Instructions Last Dose Given Next Dose Due   ferrous sulfate 325 (65 FE) MG tablet      Take 1 tablet by mouth Daily With Breakfast.       prenatal (CLASSIC) vitamin  tablet  Generic drug: prenatal vitamin      Take 1 tablet by mouth Daily.          STOP taking these medications    doxylamine 25 MG tablet  Commonly known as: UNISOM              Where to Get Your Medications      These medications were sent to [facility] [place] [id number]    Hours: Monday through Friday 7:00am to 5:00pm Phone: [phone] ·   ibuprofen 800 MG tablet  · oxyCODONE 10 MG tablet  · promethazine 12.5 MG tablet        Discharge Disposition: home   Follow-up: No future appointments.         This document has been electronically signed by Bhargav Bond MD on June 21, 2021 07:47 CDT            Electronically signed by Bhargav Bond MD at 06/21/21 0193

## 2021-06-24 ENCOUNTER — OFFICE VISIT (OUTPATIENT)
Dept: OBSTETRICS AND GYNECOLOGY | Facility: CLINIC | Age: 21
End: 2021-06-24

## 2021-06-24 VITALS
HEIGHT: 60 IN | WEIGHT: 139.4 LBS | SYSTOLIC BLOOD PRESSURE: 124 MMHG | DIASTOLIC BLOOD PRESSURE: 84 MMHG | BODY MASS INDEX: 27.37 KG/M2

## 2021-06-24 DIAGNOSIS — O12.03 EDEMA IN PREGNANCY IN THIRD TRIMESTER: ICD-10-CM

## 2021-06-24 DIAGNOSIS — Z98.890 POSTOPERATIVE STATE: Primary | ICD-10-CM

## 2021-06-24 LAB
LAB AP CASE REPORT: NORMAL
PATH REPORT.FINAL DX SPEC: NORMAL

## 2021-06-24 PROCEDURE — 99214 OFFICE O/P EST MOD 30 MIN: CPT | Performed by: OBSTETRICS & GYNECOLOGY

## 2021-06-24 RX ORDER — DOCUSATE SODIUM 100 MG/1
100 CAPSULE, LIQUID FILLED ORAL 2 TIMES DAILY
Qty: 60 CAPSULE | Refills: 1 | Status: SHIPPED | OUTPATIENT
Start: 2021-06-24 | End: 2022-01-11

## 2021-06-24 RX ORDER — OXYCODONE HYDROCHLORIDE 10 MG/1
10 TABLET ORAL EVERY 4 HOURS PRN
Qty: 18 TABLET | Refills: 0 | Status: SHIPPED | OUTPATIENT
Start: 2021-06-24 | End: 2021-06-30

## 2021-06-25 NOTE — PROGRESS NOTES
Becky Shaffer is a 20 y.o. y/o female.     Chief Complaint: Postop  swelling left leg    HPI:   20 y.o. .  Patient's last menstrual period was 2020 (approximate)..  Patient is postop  doing well except complaining of swelling left leg no streaking          Review of Systems     Constitutional: Denies night sweats    HENT: No hearing changes, denies ear pain    Eye: No eye pain; no foreign body in eye    Pulmonary: No hemoptysis    Cardiovascular: No claudication    GI: No hematemesis    Musculoskeletal: No arthralgias, no joint swelling    Endocrine: No polydipsia or polyuria    Hematologic: Denies any free bleeding    Psychiatric: Denies any delusions    The following portions of the patient's history were reviewed and updated as appropriate: allergies, current medications, past family history, past medical history, past social history, past surgical history and problem list.    No Known Allergies     Outpatient Medications Prior to Visit   Medication Sig Dispense Refill   • ferrous sulfate 325 (65 FE) MG tablet Take 1 tablet by mouth Daily With Breakfast. 30 tablet 6   • ibuprofen (ADVIL,MOTRIN) 800 MG tablet Take 1 tablet by mouth Every 8 (Eight) Hours for 14 days.  **Take with food** 42 tablet 2   • prenatal vitamin (prenatal, CLASSIC, vitamin) tablet Take 1 tablet by mouth Daily.     • promethazine (PHENERGAN) 12.5 MG tablet Take 1 tablet by mouth Every 6 (Six) Hours As Needed for Nausea or Vomiting. 30 tablet 2   • oxyCODONE (ROXICODONE) 10 MG tablet Take one-half to 1 tablet (5 to 10 mg) by mouth every 4 (four) to 6 (six) hours as needed. 18 tablet 0     No facility-administered medications prior to visit.        The patient has a family history of   Family History   Problem Relation Age of Onset   • No Known Problems Sister    • No Known Problems Brother    • Ovarian cancer Paternal Grandmother    • No Known Problems Sister    • No Known Problems Brother    • No Known  "Problems Brother         Past Medical History:   Diagnosis Date   • Anxiety    • Migraine    • Smoker         OB History        1    Para   1    Term   1            AB        Living   1       SAB        TAB        Ectopic        Molar        Multiple   0    Live Births   1                 Social History     Socioeconomic History   • Marital status: Single     Spouse name: Not on file   • Number of children: Not on file   • Years of education: Not on file   • Highest education level: 10th grade   Tobacco Use   • Smoking status: Current Every Day Smoker     Packs/day: 0.50     Types: Cigarettes   • Smokeless tobacco: Never Used   • Tobacco comment: occassional cigarettes   Vaping Use   • Vaping Use: Never used   Substance and Sexual Activity   • Alcohol use: Not Currently   • Drug use: Not Currently   • Sexual activity: Yes     Partners: Male        Past Surgical History:   Procedure Laterality Date   •  SECTION N/A 2021    Procedure:  SECTION PRIMARY;  Surgeon: Bhargav Bond MD;  Location: [facility] LABOR DELIVERY;  Service: Obstetrics/Gynecology;  Laterality: N/A;   • WISDOM TOOTH EXTRACTION          Patient Active Problem List   Diagnosis   • Maternal tobacco use in first trimester   • Nausea and vomiting during pregnancy prior to 22 weeks gestation   • Encounter for supervision of normal first pregnancy in third trimester   • Vaginal bleeding during pregnancy   • Placental abnormality in third trimester   • Antepartum placental abruption, third trimester        Documented Vitals    21 1040   BP: 124/84   Weight: 63.2 kg (139 lb 6.4 oz)   Height: 152.4 cm (60\")   PainSc:   8        Body mass index is 27.22 kg/m².    Physical Exam  Constitutional: Appears to be in no acute distress; Eyes: Sclerae normal; Endocrine system: Thyroid palpation is normal; Pulmonary system: Lungs clear; Cardiovascular system: Heart regular rate and rhythm; Gastrointestinal system: Abdomen soft and " nontender, active bowel sounds; Urologic system: CVA negative; Psychiatric: Appropriate insight; Neurologic: Gait within normal limits there is swelling left leg no streaking    Laboratory Data:   Lab Results - Last 18 Months   Lab Units 04/29/21  2100 04/17/21  1332   GLUCOSE mg/dL 81 80   BUN mg/dL 5* 7   CREATININE mg/dL 0.46* 0.46*   SODIUM mmol/L 137 135*   POTASSIUM mmol/L 3.7 3.9   CHLORIDE mmol/L 107 107   CO2 mmol/L 20.0* 24.0   CALCIUM mg/dL 9.0 9.1   TOTAL PROTEIN g/dL 6.3 6.1   ALBUMIN g/dL 3.90 3.50   ALT (SGPT) U/L 19 13   AST (SGOT) U/L 17 16   ALK PHOS U/L 105 88   BILIRUBIN mg/dL 0.2 0.2   EGFR IF NONAFRICN AM mL/min/1.73 >150 >150   GLOBULIN gm/dL 2.4 2.6   A/G RATIO g/dL 1.6 1.3   BUN / CREAT RATIO  10.9 15.2   ANION GAP mmol/L 10.0 4.0*     Lab Results - Last 18 Months   Lab Units 06/20/21  0751 06/19/21  0534 05/02/21  0636 05/01/21  0614 04/30/21  0322   WBC 10*3/mm3 18.48* 14.51* 20.75* 21.38* 17.84*   RBC 10*6/mm3 3.23* 4.12 3.49* 3.63* 3.74*   HEMOGLOBIN g/dL 9.9* 12.7 11.1* 11.6* 12.0   HEMATOCRIT % 29.4* 36.5 31.9* 33.0* 33.5*   MCV fL 91.0 88.6 91.4 90.9 89.6   MCH pg 30.7 30.8 31.8 32.0 32.1   MCHC g/dL 33.7 34.8 34.8 35.2 35.8*   RDW % 14.6 14.6 13.6 13.5 13.4   RDW-SD fl 48.9 46.5 45.1 44.0 43.7   MPV fL 12.0 12.0 12.2* 12.3* 12.2*   PLATELETS 10*3/mm3 125* 159 131* 144 144      US fetal biophysical profile wo non stress testing    Result Date: 6/18/2021  1. Single live intrauterine fetus in cephalic presentation 2. Fetal biophysical profile score is 8/8 Electronically signed by:  Castro Yang MD  6/18/2021 9:35 PM CDT Workstation: [id number]    US venous doppler lower extremity left (duplex)    Result Date: 6/24/2021  No evidence of deep venous thrombosis in the common femoral, femoral, or popliteal veins of the left     lower extremity. Electronically signed by:  Jacob Riggins MD  6/24/2021 1:15 PM CDT Workstation: [id number]    Assessment        Diagnosis Plan   1. Postoperative state   oxyCODONE (ROXICODONE) 10 MG tablet   2. Edema in pregnancy in third trimester  US venous doppler lower extremity right (duplex)    US venous doppler lower extremity left (duplex)       US venous doppler lower extremity left (duplex)    Result Date: 6/24/2021  No evidence of deep venous thrombosis in the common femoral, femoral, or popliteal veins of the left     lower extremity. Electronically signed by:  Jacob Riggins MD  6/24/2021 1:15 PM CDT Workstation: [id number]    Plan         New Medications Ordered This Visit   Medications   • docusate sodium (Colace) 100 MG capsule     Sig: Take 1 capsule by mouth 2 (Two) Times a Day.     Dispense:  60 capsule     Refill:  1   • oxyCODONE (ROXICODONE) 10 MG tablet     Sig: Take 1 tablet by mouth Every 4 (Four) Hours As Needed for Moderate Pain .     Dispense:  18 tablet     Refill:  0             This document has been electronically signed by Bhargav Bond MD on June 25, 2021 13:27 CDT    Please note that portions of this note were completed with a voice recognition program.

## 2021-06-28 ENCOUNTER — TELEPHONE (OUTPATIENT)
Dept: OBSTETRICS AND GYNECOLOGY | Facility: CLINIC | Age: 21
End: 2021-06-28

## 2021-06-28 NOTE — TELEPHONE ENCOUNTER
PT called back was wanting to see is she can come in tomorrow. She said she just spoke with you earlier.     [phone]

## 2021-06-30 ENCOUNTER — POSTPARTUM VISIT (OUTPATIENT)
Dept: OBSTETRICS AND GYNECOLOGY | Facility: CLINIC | Age: 21
End: 2021-06-30

## 2021-06-30 VITALS
BODY MASS INDEX: 25.91 KG/M2 | SYSTOLIC BLOOD PRESSURE: 108 MMHG | HEIGHT: 60 IN | WEIGHT: 132 LBS | DIASTOLIC BLOOD PRESSURE: 66 MMHG

## 2021-06-30 DIAGNOSIS — Z98.890 POSTOPERATIVE STATE: Primary | ICD-10-CM

## 2021-06-30 PROCEDURE — 99214 OFFICE O/P EST MOD 30 MIN: CPT | Performed by: OBSTETRICS & GYNECOLOGY

## 2021-06-30 RX ORDER — OXYCODONE HYDROCHLORIDE 10 MG/1
10 TABLET ORAL EVERY 4 HOURS PRN
Qty: 15 TABLET | Refills: 0 | Status: SHIPPED | OUTPATIENT
Start: 2021-06-30 | End: 2022-01-11

## 2022-01-11 ENCOUNTER — LAB (OUTPATIENT)
Dept: LAB | Facility: OTHER | Age: 22
End: 2022-01-11

## 2022-01-11 PROCEDURE — 87635 SARS-COV-2 COVID-19 AMP PRB: CPT | Performed by: PHYSICIAN ASSISTANT

## (undated) DEVICE — SPNG LAP 18X18IN LF STRL PK/5

## (undated) DEVICE — TRY SPINE PENCAN 24GA X4IN

## (undated) DEVICE — GLV SURG MICROTOUCH LTX SZ7 STRL

## (undated) DEVICE — SUT GUT CHRM 1 CTX 36IN 905H

## (undated) DEVICE — SUT  GUT PLAIN 2/0 CT1 27IN 843H

## (undated) DEVICE — SUT MNCRYL 3/0 Y936H

## (undated) DEVICE — SUT VIC PLS 0 CTX 36IN UD VCP978H

## (undated) DEVICE — UNDYED BRAIDED (POLYGLACTIN 910), SYNTHETIC ABSORBABLE SUTURE: Brand: COATED VICRYL

## (undated) DEVICE — DRSNG TELFA PAD NONADH STR 1S 3X8IN

## (undated) DEVICE — GARMENT,MEDLINE,DVT,INT,CALF,MED, GEN2: Brand: MEDLINE

## (undated) DEVICE — PK C/SECT 60

## (undated) DEVICE — PAD,ABDOMINAL,8"X10",ST,LF: Brand: MEDLINE

## (undated) DEVICE — ADHS LIQ MASTISOL 2/3ML

## (undated) DEVICE — SYS CLS SKIN PREMIERPRO EXOFINFUSION 22CM

## (undated) DEVICE — GLV SURG SENSICARE PI LF PF 7.5

## (undated) DEVICE — SUT GUT CHRM 2/0 SH 27IN G123H